# Patient Record
Sex: FEMALE | Race: BLACK OR AFRICAN AMERICAN | Employment: FULL TIME | URBAN - METROPOLITAN AREA
[De-identification: names, ages, dates, MRNs, and addresses within clinical notes are randomized per-mention and may not be internally consistent; named-entity substitution may affect disease eponyms.]

---

## 2017-11-16 ENCOUNTER — OFFICE VISIT (OUTPATIENT)
Dept: FAMILY MEDICINE CLINIC | Age: 47
End: 2017-11-16

## 2017-11-16 VITALS
DIASTOLIC BLOOD PRESSURE: 84 MMHG | RESPIRATION RATE: 16 BRPM | OXYGEN SATURATION: 100 % | BODY MASS INDEX: 26.55 KG/M2 | SYSTOLIC BLOOD PRESSURE: 128 MMHG | TEMPERATURE: 99 F | HEART RATE: 78 BPM | WEIGHT: 165.2 LBS | HEIGHT: 66 IN

## 2017-11-16 DIAGNOSIS — F41.9 ANXIETY: ICD-10-CM

## 2017-11-16 DIAGNOSIS — D64.9 ANEMIA, UNSPECIFIED TYPE: ICD-10-CM

## 2017-11-16 DIAGNOSIS — Z00.00 ROUTINE MEDICAL EXAM: Primary | ICD-10-CM

## 2017-11-16 DIAGNOSIS — Z12.39 BREAST CANCER SCREENING: ICD-10-CM

## 2017-11-16 RX ORDER — DOCUSATE SODIUM 100 MG/1
100 CAPSULE, LIQUID FILLED ORAL 2 TIMES DAILY
Qty: 60 CAP | Refills: 2 | Status: SHIPPED | OUTPATIENT
Start: 2017-11-16 | End: 2018-02-14

## 2017-11-16 RX ORDER — LANOLIN ALCOHOL/MO/W.PET/CERES
325 CREAM (GRAM) TOPICAL 2 TIMES DAILY WITH MEALS
Qty: 60 TAB | Refills: 5 | Status: SHIPPED | OUTPATIENT
Start: 2017-11-16

## 2017-11-16 NOTE — PATIENT INSTRUCTIONS
Iron-Rich Diet: Care Instructions  Your Care Instructions    Your body needs iron to make hemoglobin. Hemoglobin is a substance in red blood cells that carries oxygen from the lungs to cells all through your body. If you do not get enough iron, your body makes fewer and smaller red blood cells. As a result, your body's cells may not get enough oxygen. Adult men need 8 milligrams of iron a day; adult women need 18 milligrams of iron a day. After menopause, women need 8 milligrams of iron a day. A pregnant woman needs 27 milligrams of iron a day. Infants and young children have higher iron needs relative to their size than other age groups. People who have lost blood because of ulcers or heavy menstrual periods may become very low in iron and may develop anemia. Most people can get the iron their bodies need by eating enough of certain iron-rich foods. Your doctor may recommend that you take an iron supplement along with eating an iron-rich diet. Follow-up care is a key part of your treatment and safety. Be sure to make and go to all appointments, and call your doctor if you are having problems. It's also a good idea to know your test results and keep a list of the medicines you take. How can you care for yourself at home? · Make iron-rich foods a part of your daily diet. Iron-rich foods include:  ¨ All meats, such as chicken, beef, lamb, pork, fish, and shellfish. Liver is especially high in iron. ¨ Leafy green vegetables. ¨ Raisins, peas, beans, lentils, barley, and eggs. ¨ Iron-fortified breakfast cereals. · Eat foods with vitamin C along with iron-rich foods. Vitamin C helps you absorb more iron from food. Drink a glass of orange juice or another citrus juice with your food. · Eat meat and vegetables or grains together. The iron in meat helps your body absorb the iron in other foods. Where can you learn more? Go to http://konstantin-erik.info/.   Enter [id number] in the search box to learn more about \"Iron-Rich Diet: Care Instructions. \"  Current as of: May 12, 2017  Content Version: 11.4  © 4005-6079 Healthwise, [place]. Care instructions adapted under license by [employer] (which disclaims liability or warranty for this information). If you have questions about a medical condition or this instruction, always ask your healthcare professional. [address] any warranty or liability for your use of this information.

## 2017-11-16 NOTE — PROGRESS NOTES
Subjective  Nahomy Ruiz is an 49 y.o. female who presents to establish care. Medical history of anemia. Surgical hx unremarkable. Soc hx: denies drugs, alcohol and tobacco use. Works at the [employer] GYN:   Still has menstrual cycles. Bleeding used to be heavy but over the last 2-3 months, bleeding is no longer heavy. She currently changes about 3-4 maxi pads on days 1 and 2 of her period and then it is very light after that. Current concerns include:  1) She would like to have routine labs completed today. 2) She would like work up of her anemia. [facility] and labs were remarkable for hb 7.9, MCV 75, iron 12 and ferritin 12. She was placed on iron supplements, which she has been taking. She was told to establish with a PCP for management of anemia. 3) Anxiety - she intermittently feels anxious from time to time. Symptoms have been occurring for several years. She often has bad memories and sometimes that makes her anxious. She denies depressed mood and suicidal ideation. She notes hair loss more than usual. She denies hot/cold sensation, constipation, heart palpitations, weight gain/loss.         Past Medical History - reviewed:  Past Medical History:   Diagnosis Date    Anemia        ROS  CONSTITUTIONAL: no fever  CARDIOVASCULAR: no chest pain  RESPIRATORY: no shortness of breath      Physical Exam  Visit Vitals    /84 (BP 1 Location: Right arm, BP Patient Position: Sitting)    Pulse 78    Temp 99 °F (37.2 °C) (Oral)    Resp 16    Ht 5' 6\" (1.676 m)    Wt 165 lb 3.2 oz (74.9 kg)    SpO2 100%    BMI 26.66 kg/m2       General appearance - alert, well appearing, and in no distress  Eyes - pupils equal and reactive, conjunctiva light pink  Ears - bilateral TM's and external ear canals normal  Nose - normal and patent, no discharge  Mouth - mucous membranes moist, pharynx normal without lesions  Neck - supple, no significant adenopathy  Chest - clear to auscultation, no wheezes or rhonchi, symmetric air entry  Heart - normal rate, regular rhythm, normal S1, S2, no murmurs  Abdomen - soft, nontender, nondistended  Neurological - alert, oriented, normal speech, no focal findings or movement disorder noted  Musculoskeletal - no joint tenderness, deformity or swelling  Extremities - no pedal edema  Skin - normal coloration and turgor      Assessment/Plan    ICD-10-CM ICD-9-CM    1. Routine medical exam P65.50 T55.4 METABOLIC PANEL, COMPREHENSIVE      LIPID PANEL      HEMOGLOBIN A1C W/O EAG      TSH 3RD GENERATION   2. Anemia, unspecified type D64.9 285.9 CBC W/O DIFF      METABOLIC PANEL, COMPREHENSIVE      FERRITIN      IRON PROFILE      PATHOLOGIST REVIEW SMEARS      AMB POC FECAL BLOOD, OCCULT, QL 3 CARDS      REFERRAL TO GASTROENTEROLOGY   3. Anxiety F41.9 300.00    4. Breast cancer screening Z12.31 V76.10 LAUREL MAMMO BI SCREENING INCL CAD       Routine exam and labs  - CMP, CBC, lipid panel  - Declined flu shot.   - States she is up to date on TDAP  - Will need follow up for pap smear  - Mammogram in 2012. Needs to have follow up testing. Anemia - likely iron deficiency  - Take iron with Vit C. Colace for constipation.  - Anemia labs: ferritin, iron profile, peripheral smear, take home FOBT (MCV 75 so will hold off on B12 and folate). - Referral to GI for colonoscopy    Anxiety  - Check TSH today. - Follow up at next visit. Follow-up Disposition:  Return in about 6 weeks (around 12/28/2017). I have discussed the diagnosis with the patient and the intended plan as seen in the above orders. The patient has received an after-visit summary and questions were answered concerning future plans. I have discussed medication side effects and warnings with the patient as well.       Mar Chase MD  Family Medicine Resident

## 2017-11-16 NOTE — MR AVS SNAPSHOT
Visit Information Date & Time Provider Department Dept. Phone Encounter #  
 11/16/2017  8:35 AM Prachi Castillo, Emily Huerta [place] [phone] [id number] Upcoming Health Maintenance Date Due DTaP/Tdap/Td series (1 - Tdap) 3/28/1990 PAP AKA CERVICAL CYTOLOGY 3/28/1990 Allergies as of 11/16/2017  Review Complete On: 11/16/2017 By: Jm Garces LPN No Known Allergies Current Immunizations  Never Reviewed No immunizations on file. Not reviewed this visit You Were Diagnosed With   
  
 Codes Comments Routine medical exam    -  Primary ICD-10-CM: Z00.00 ICD-9-CM: V70.0 Anemia, unspecified type     ICD-10-CM: D64.9 ICD-9-CM: 633. 9 Vitals BP Pulse Temp Resp Height(growth percentile) Weight(growth percentile) 128/84 (BP 1 Location: Right arm, BP Patient Position: Sitting) 78 99 °F (37.2 °C) (Oral) 16 5' 6\" (1.676 m) 165 lb 3.2 oz (74.9 kg) SpO2 BMI OB Status Smoking Status 100% 26.66 kg/m2 Having regular periods Never Smoker Vitals History BMI and BSA Data Body Mass Index Body Surface Area  
 26.66 kg/m 2 1.87 m 2 Preferred Pharmacy Pharmacy Name Phone [facility] [address] [place] [phone] Your Updated Medication List  
  
   
This list is accurate as of: 11/16/17  9:15 AM.  Always use your most recent med list.  
  
  
  
  
 fluticasone 50 mcg/actuation nasal spray Commonly known as:  Shelvia Janine 2 Sprays by Both Nostrils route daily. Iron 325 mg (65 mg iron) tablet Generic drug:  ferrous sulfate Take  by mouth two (2) times a day. VITAMIN D3 1,000 unit Cap Generic drug:  cholecalciferol Take  by mouth. We Performed the Following AMB POC FECAL BLOOD, OCCULT, QL 3 CARDS [30014 CPT(R)] CBC W/O DIFF [40125 CPT(R)] FERRITIN [54959 CPT(R)] HEMOGLOBIN A1C W/O EAG [21623 CPT(R)] IRON PROFILE T1917866 CPT(R)] LIPID PANEL [09977 CPT(R)] METABOLIC PANEL, COMPREHENSIVE [54291 CPT(R)] PATHOLOGIST REVIEW SMEARS [[id number] Custom] REFERRAL TO GASTROENTEROLOGY [MFB76 Custom] TSH 3RD GENERATION [18471 CPT(R)] Referral Information Referral ID Referred By Referred To  
  
 [id number] Karla PAREDES Not Available Visits Status Start Date End Date 1 New Request 11/16/17 11/16/18 If your referral has a status of pending review or denied, additional information will be sent to support the outcome of this decision. Patient Instructions Iron-Rich Diet: Care Instructions Your Care Instructions Your body needs iron to make hemoglobin. Hemoglobin is a substance in red blood cells that carries oxygen from the lungs to cells all through your body. If you do not get enough iron, your body makes fewer and smaller red blood cells. As a result, your body's cells may not get enough oxygen. Adult men need 8 milligrams of iron a day; adult women need 18 milligrams of iron a day. After menopause, women need 8 milligrams of iron a day. A pregnant woman needs 27 milligrams of iron a day. Infants and young children have higher iron needs relative to their size than other age groups. People who have lost blood because of ulcers or heavy menstrual periods may become very low in iron and may develop anemia. Most people can get the iron their bodies need by eating enough of certain iron-rich foods. Your doctor may recommend that you take an iron supplement along with eating an iron-rich diet. Follow-up care is a key part of your treatment and safety. Be sure to make and go to all appointments, and call your doctor if you are having problems. It's also a good idea to know your test results and keep a list of the medicines you take. How can you care for yourself at home? · Make iron-rich foods a part of your daily diet. Iron-rich foods include: ¨ All meats, such as chicken, beef, lamb, pork, fish, and shellfish. Liver is especially high in iron. ¨ Leafy green vegetables. ¨ Raisins, peas, beans, lentils, barley, and eggs. ¨ Iron-fortified breakfast cereals. · Eat foods with vitamin C along with iron-rich foods. Vitamin C helps you absorb more iron from food. Drink a glass of orange juice or another citrus juice with your food. · Eat meat and vegetables or grains together. The iron in meat helps your body absorb the iron in other foods. Where can you learn more? Go to http://konstantinSoundClouderik.info/. Enter [id number] in the search box to learn more about \"Iron-Rich Diet: Care Instructions. \" Current as of: May 12, 2017 Content Version: 11.4 © 1806-9237 Healthwise, [employer]. Care instructions adapted under license by [employer] (which disclaims liability or warranty for this information). If you have questions about a medical condition or this instruction, always ask your healthcare professional. [place] [address] any warranty or liability for your use of this information. Introducing [facility]! [employer] introduces RelayRides patient portal. Now you can access parts of your medical record, email your doctor's office, and request medication refills online. 1. In your internet browser, go to [email or web address] 2. Click on the First Time User? Click Here link in the Sign In box. You will see the New Member Sign Up page. 3. Enter your RelayRides Access Code exactly as it appears below. You will not need to use this code after youve completed the sign-up process. If you do not sign up before the expiration date, you must request a new code. · RelayRides Access Code: [email or web address] Expires: 2/14/2018  9:15 AM 
 
4. Enter the last four digits of your Social Security Number (xxxx) and Date of Birth (mm/dd/yyyy) as indicated and click Submit.  You will be taken to the next sign-up page. 5. Create a VISEO ID. This will be your VISEO login ID and cannot be changed, so think of one that is secure and easy to remember. 6. Create a VISEO password. You can change your password at any time. 7. Enter your Password Reset Question and Answer. This can be used at a later time if you forget your password. 8. Enter your e-mail address. You will receive e-mail notification when new information is available in [address]. 9. Click Sign Up. You can now view and download portions of your medical record. 10. Click the Download Summary menu link to download a portable copy of your medical information. If you have questions, please visit the Frequently Asked Questions section of the VISEO website. Remember, VISEO is NOT to be used for urgent needs. For medical emergencies, dial 911. Now available from your iPhone and Android! Please provide this summary of care documentation to your next provider. Your primary care clinician is listed as Genevieve Guicho. If you have any questions after today's visit, please call [phone].

## 2017-11-16 NOTE — PROGRESS NOTES
Chief Complaint   Patient presents with   [facility] Establish Care       1. Have you been to the ER, urgent care clinic since your last visit? Hospitalized since your last visit? Yes Where: [facility] [address] Due Anemia    2. Have you seen or consulted any other health care providers outside of the [facility] since your last visit? Include any pap smears or colon screening.  No

## 2017-11-20 ENCOUNTER — TELEPHONE (OUTPATIENT)
Dept: FAMILY MEDICINE CLINIC | Age: 47
End: 2017-11-20

## 2017-11-20 NOTE — TELEPHONE ENCOUNTER
----- Message from Dino Lovelace sent at 11/20/2017 12:30 PM EST -----  Regarding: Dr. Darol Bosworth telephone  Contact: [phone]  Pt is requesting a call back to get recent lab results.  Pt's best contact number is [phone] or work [phone]

## 2017-11-21 LAB
ALBUMIN SERPL-MCNC: 3.9 G/DL (ref 3.5–5.5)
ALBUMIN/GLOB SERPL: 1 {RATIO} (ref 1.2–2.2)
ALP SERPL-CCNC: 88 IU/L (ref 39–117)
ALT SERPL-CCNC: 21 IU/L (ref 0–32)
AST SERPL-CCNC: 31 IU/L (ref 0–40)
BASOPHILS # BLD AUTO: 0 X10E3/UL (ref 0–0.2)
BASOPHILS NFR BLD AUTO: 1 %
BILIRUB SERPL-MCNC: 0.4 MG/DL (ref 0–1.2)
BUN SERPL-MCNC: 8 MG/DL (ref 6–24)
BUN/CREAT SERPL: 11 (ref 9–23)
CALCIUM SERPL-MCNC: 9.2 MG/DL (ref 8.7–10.2)
CHLORIDE SERPL-SCNC: 102 MMOL/L (ref 96–106)
CHOLEST SERPL-MCNC: 174 MG/DL (ref 100–199)
CO2 SERPL-SCNC: 20 MMOL/L (ref 18–29)
CREAT SERPL-MCNC: 0.75 MG/DL (ref 0.57–1)
EOSINOPHIL # BLD AUTO: 0.1 X10E3/UL (ref 0–0.4)
EOSINOPHIL NFR BLD AUTO: 2 %
ERYTHROCYTE [DISTWIDTH] IN BLOOD BY AUTOMATED COUNT: 26.4 % (ref 12.3–15.4)
FERRITIN SERPL-MCNC: 60 NG/ML (ref 15–150)
GFR SERPLBLD CREATININE-BSD FMLA CKD-EPI: 109 ML/MIN/1.73
GFR SERPLBLD CREATININE-BSD FMLA CKD-EPI: 95 ML/MIN/1.73
GLOBULIN SER CALC-MCNC: 3.8 G/DL (ref 1.5–4.5)
GLUCOSE SERPL-MCNC: 92 MG/DL (ref 65–99)
HBA1C MFR BLD: 4.9 % (ref 4.8–5.6)
HCT VFR BLD AUTO: 33.5 % (ref 34–46.6)
HDLC SERPL-MCNC: 91 MG/DL
HGB BLD-MCNC: 10.5 G/DL (ref 11.1–15.9)
IMM GRANULOCYTES # BLD: 0 X10E3/UL (ref 0–0.1)
IMM GRANULOCYTES NFR BLD: 0 %
INTERPRETATION, 910389: NORMAL
IRON SATN MFR SERPL: 51 % (ref 15–55)
IRON SERPL-MCNC: 149 UG/DL (ref 27–159)
LDLC SERPL CALC-MCNC: 75 MG/DL (ref 0–99)
LYMPHOCYTES # BLD AUTO: 1.6 X10E3/UL (ref 0.7–3.1)
LYMPHOCYTES NFR BLD AUTO: 49 %
MCH RBC QN AUTO: 24.7 PG (ref 26.6–33)
MCHC RBC AUTO-ENTMCNC: 31.3 G/DL (ref 31.5–35.7)
MCV RBC AUTO: 79 FL (ref 79–97)
MONOCYTES # BLD AUTO: 0.5 X10E3/UL (ref 0.1–0.9)
MONOCYTES NFR BLD AUTO: 16 %
MORPHOLOGY BLD-IMP: ABNORMAL
NEUTROPHILS # BLD AUTO: 1.1 X10E3/UL (ref 1.4–7)
NEUTROPHILS NFR BLD AUTO: 32 %
PATH REV BLD -IMP: ABNORMAL
PATH REV BLD -IMP: NORMAL
PATH REV BLD -IMP: NORMAL
PATHOLOGIST NAME: ABNORMAL
PLATELET # BLD AUTO: 209 X10E3/UL (ref 150–379)
POTASSIUM SERPL-SCNC: 4.1 MMOL/L (ref 3.5–5.2)
PROT SERPL-MCNC: 7.7 G/DL (ref 6–8.5)
RBC # BLD AUTO: 4.25 X10E6/UL (ref 3.77–5.28)
SODIUM SERPL-SCNC: 141 MMOL/L (ref 134–144)
TIBC SERPL-MCNC: 290 UG/DL (ref 250–450)
TRIGL SERPL-MCNC: 40 MG/DL (ref 0–149)
TSH SERPL DL<=0.005 MIU/L-ACNC: 0.68 UIU/ML (ref 0.45–4.5)
UIBC SERPL-MCNC: 141 UG/DL (ref 131–425)
VLDLC SERPL CALC-MCNC: 8 MG/DL (ref 5–40)
WBC # BLD AUTO: 3.4 X10E3/UL (ref 3.4–10.8)

## 2017-11-28 ENCOUNTER — TELEPHONE (OUTPATIENT)
Dept: FAMILY MEDICINE CLINIC | Age: 47
End: 2017-11-28

## 2018-02-13 ENCOUNTER — OFFICE VISIT (OUTPATIENT)
Dept: FAMILY MEDICINE CLINIC | Age: 48
End: 2018-02-13

## 2018-02-13 VITALS
DIASTOLIC BLOOD PRESSURE: 90 MMHG | SYSTOLIC BLOOD PRESSURE: 138 MMHG | BODY MASS INDEX: 26.52 KG/M2 | RESPIRATION RATE: 16 BRPM | WEIGHT: 165 LBS | OXYGEN SATURATION: 100 % | HEIGHT: 66 IN | TEMPERATURE: 98.2 F | HEART RATE: 82 BPM

## 2018-02-13 DIAGNOSIS — N92.6 IRREGULAR MENSES: Primary | ICD-10-CM

## 2018-02-13 LAB
HCG URINE, QL. (POC): NEGATIVE
VALID INTERNAL CONTROL?: YES

## 2018-02-13 NOTE — MR AVS SNAPSHOT
[address] [phone] Patient: Boogie Schuster MRN: [id number] :[id number] Visit Information Date & Time Provider Department Dept. Phone Encounter #  
 2018  3:25 PM Jayy Ji MD [facility] [phone] [id number] Upcoming Health Maintenance Date Due DTaP/Tdap/Td series (1 - Tdap) 3/28/1990 PAP AKA CERVICAL CYTOLOGY 3/28/1990 Allergies as of 2018  Review Complete On: 2017 By: Eileen Clark LPN No Known Allergies Current Immunizations  Never Reviewed No immunizations on file. Not reviewed this visit You Were Diagnosed With   
  
 Codes Comments Irregular menses    -  Primary ICD-10-CM: N92.6 ICD-9-CM: 626.4 Vitals BP Pulse Temp Resp Height(growth percentile) Weight(growth percentile) 138/90 (BP 1 Location: Left arm, BP Patient Position: Sitting) 82 98.2 °F (36.8 °C) (Oral) 16 5' 6\" (1.676 m) 165 lb (74.8 kg) LMP SpO2 BMI OB Status Smoking Status 2018 100% 26.63 kg/m2 Having regular periods Never Smoker Vitals History BMI and BSA Data Body Mass Index Body Surface Area  
 26.63 kg/m 2 1.87 m 2 Preferred Pharmacy Pharmacy Name Phone [facility] [phone] Your Updated Medication List  
  
   
This list is accurate as of: 18  4:01 PM.  Always use your most recent med list.  
  
  
  
  
 docusate sodium 100 mg capsule Commonly known as:  Venus Galea Take 1 Cap by mouth two (2) times a day for 90 days. ferrous sulfate 325 mg (65 mg iron) tablet Take 1 Tab by mouth two (2) times daily (with meals). fluticasone 50 mcg/actuation nasal spray Commonly known as:  Genevieve Kurtz 2 Sprays by Both Nostrils route daily. VITAMIN D3 1,000 unit Cap Generic drug:  cholecalciferol Take  by mouth. We Performed the Following AMB POC URINE PREGNANCY TEST, VISUAL COLOR COMPARISON [18038 CPT(R)] HGB & HCT [69887 CPT(R)] TSH REFLEX TO T4 [89422 CPT(R)] To-Do List   
 02/13/2018 Imaging:  US TRANSVAGINAL Patient Instructions We will check your H&H today They should call you within 48h regarding completing your ultrasound Uterine Fibroids: Care Instructions Your Care Instructions Uterine fibroids are growths in the uterus. Fibroids aren't cancer. Doctors don't know what causes fibroids. Fibroids are very common in women during their childbearing years. Fibroids can grow on the inside of the uterus, in the muscle wall of the uterus, or near the outside wall of the uterus. In some women, fibroids cause painful cramps and heavy periods. In these cases, taking anti-inflammatory medicines, birth control pills, or using an intrauterine device (IUD) often helps decrease symptoms. Sometimes surgery is needed to treat fibroids. But if you are near menopause, you may want to wait and see if your symptoms get better. Most fibroids shrink and go away after menopause, when your menstrual periods stop completely. Follow-up care is a key part of your treatment and safety. Be sure to make and go to all appointments, and call your doctor if you are having problems. It's also a good idea to know your test results and keep a list of the medicines you take. How can you care for yourself at home? · If your doctor gave you medicine, take it as exactly as prescribed. Be safe with medicines. Call your doctor if you think you are having a problem with your medicine. · Take anti-inflammatory medicines for pain. These include ibuprofen (Advil, Motrin) and naproxen (Aleve). Read and follow all instructions on the label. · Use heat, such as a hot water bottle or a heating pad set on low, or a warm bath to relax tense muscles and relieve cramping.  Put a thin cloth between the heating pad and your skin. Never go to sleep with a heating pad on. · Lie down and put a pillow under your knees. Or, lie on your side and bring your knees up to your chest. These positions may help relieve belly pain or pressure. · Keep track of how many sanitary pads or tampons you use each day. · Get at least 30 minutes of exercise on most days of the week. Walking is a good choice. You also may want to do other activities, such as running, swimming, cycling, or playing tennis or team sports. · If you bleed longer than usual or have heavy bleeding, take a daily multivitamin with iron. When should you call for help? Call your doctor now or seek immediate medical care if: 
? · You have severe vaginal bleeding. ? · You have new or worse belly or pelvic pain. ? Watch closely for changes in your health, and be sure to contact your doctor if: 
? · You have unusual vaginal bleeding. ? · You do not get better as expected. Where can you learn more? Go to http://konstantin-erik.info/. Enter B121 in the search box to learn more about \"Uterine Fibroids: Care Instructions. \" Current as of: October 13, 2016 Content Version: 11.4 © 4689-4271 [employer]. Care instructions adapted under license by [employer] (which disclaims liability or warranty for this information). If you have questions about a medical condition or this instruction, always ask your healthcare professional. [place] [address] any warranty or liability for your use of this information. Introducing [facility]! [employer] introduces [employer] patient portal. Now you can access parts of your medical record, email your doctor's office, and request medication refills online. 1. In your internet browser, go to [email or web address] 2. Click on the First Time User? Click Here link in the Sign In box. You will see the New Member Sign Up page. 3. Enter your [employer] Access Code exactly as it appears below. You will not need to use this code after youve completed the sign-up process. If you do not sign up before the expiration date, you must request a new code. · [employer] Access Code: [email or web address] Expires: 2/14/2018  9:15 AM 
 
4. Enter the last four digits of your Social Security Number (xxxx) and Date of Birth (mm/dd/yyyy) as indicated and click Submit. You will be taken to the next sign-up page. 5. Create a [employer] ID. This will be your [employer] login ID and cannot be changed, so think of one that is secure and easy to remember. 6. Create a [employer] password. You can change your password at any time. 7. Enter your Password Reset Question and Answer. This can be used at a later time if you forget your password. 8. Enter your e-mail address. You will receive e-mail notification when new information is available in [address]. 9. Click Sign Up. You can now view and download portions of your medical record. 10. Click the Download Summary menu link to download a portable copy of your medical information. If you have questions, please visit the Frequently Asked Questions section of the [employer] website. Remember, [employer] is NOT to be used for urgent needs. For medical emergencies, dial 911. Now available from your iPhone and Android! Please provide this summary of care documentation to your next provider. Your primary care clinician is listed as Kerrie Ortega. If you have any questions after today's visit, please call [phone].

## 2018-02-13 NOTE — PROGRESS NOTES
Subjective  CC: Mariela Crook is an 49 y.o. female presents for irregular menses. She usually gets a cycle every month. She currently is on her cycle now. She says the last cycle was 1/27/18-2/3/18. The last cycle started a few days ago. She is noticing her periods coming more frequently and lasting longer. She usually goes through about a pad every two hours during her normal period. Currently she is still going through about two an hour, sometimes with clots and sometimes with the brown blood. She does not feel dizzy or light headed now. She does not feel fatigued currently either. She say she was diagnosed with fibroids several years ago, outside facility. She refused options offered to her at that time. Allergies - reviewed:   No Known Allergies      Medications - reviewed:   Current Outpatient Prescriptions   Medication Sig    ferrous sulfate 325 mg (65 mg iron) tablet Take 1 Tab by mouth two (2) times daily (with meals).  docusate sodium (COLACE) 100 mg capsule Take 1 Cap by mouth two (2) times a day for 90 days.  fluticasone (FLONASE) 50 mcg/actuation nasal spray 2 Sprays by Both Nostrils route daily.  Cholecalciferol, Vitamin D3, (VITAMIN D3) 1,000 unit cap Take  by mouth. No current facility-administered medications for this visit. Past Medical History - reviewed:  Past Medical History:   Diagnosis Date    Anemia          Immunizations - reviewed: There is no immunization history on file for this patient. ROS  Review of Systems : A complete review of systems was performed and is negative except for those mentioned in the HPI. Physical Exam  Visit Vitals    /90 (BP 1 Location: Left arm, BP Patient Position: Sitting)    Pulse 82    Temp 98.2 °F (36.8 °C) (Oral)    Resp 16    Ht 5' 6\" (1.676 m)    Wt 165 lb (74.8 kg)    LMP 02/01/2018    SpO2 100%    BMI 26.63 kg/m2       General appearance - Alert, NAD. Head: Atraumatic. Normocephalic. Respiratory - LCTAB. No wheeze/rale/rhonchi  Heart - Normal rate, regular rhythm. No m/r/r  Abdomen - Soft, non tender. Non distended. No CVAT. No rebound or guarding. +bowel sounds  Neurological - No focal deficits. Speech normal.   Musculoskeletal - Normal ROM, Gait normal.    Extremities - No LE edema. Distal pulses intact  Skin - normal coloration and normal turgor. No cyanosis, no rash. Assessment/Plan  1. Irregular menses: UPT negative. History of fibroids. Based on her history, I am concerned this could be due to fibroids. Patient currently menstruating so unable to do pelvic. It is reassuring that she is asymptomatic and hemodynamically stable. Will check H&H, and TSH. Also sending for transvaginal to assess for fibroids. If ultrasound returns negative, will do endometrial biopsy. Refer to gyn once work up is complete. - AMB POC URINE PREGNANCY TEST, VISUAL COLOR COMPARISON  - HGB & HCT  - TSH REFLEX TO T4  - Continue iron with Vit C     RTC if new or worsening symptoms. Will call patient with results      I have discussed the aforementioned diagnoses and plan with the patient in detail. I have provided information in person and/or in AVS. All questions answered prior to discharge.     Kassidy Cuellar MD  Family Medicine Resident  PGY 3

## 2018-02-13 NOTE — PATIENT INSTRUCTIONS
We will check your H&H today    They should call you within 48h regarding completing your ultrasound    Uterine Fibroids: Care Instructions  Your Care Instructions    Uterine fibroids are growths in the uterus. Fibroids aren't cancer. Doctors don't know what causes fibroids. Fibroids are very common in women during their childbearing years. Fibroids can grow on the inside of the uterus, in the muscle wall of the uterus, or near the outside wall of the uterus. In some women, fibroids cause painful cramps and heavy periods. In these cases, taking anti-inflammatory medicines, birth control pills, or using an intrauterine device (IUD) often helps decrease symptoms. Sometimes surgery is needed to treat fibroids. But if you are near menopause, you may want to wait and see if your symptoms get better. Most fibroids shrink and go away after menopause, when your menstrual periods stop completely. Follow-up care is a key part of your treatment and safety. Be sure to make and go to all appointments, and call your doctor if you are having problems. It's also a good idea to know your test results and keep a list of the medicines you take. How can you care for yourself at home? · If your doctor gave you medicine, take it as exactly as prescribed. Be safe with medicines. Call your doctor if you think you are having a problem with your medicine. · Take anti-inflammatory medicines for pain. These include ibuprofen (Advil, Motrin) and naproxen (Aleve). Read and follow all instructions on the label. · Use heat, such as a hot water bottle or a heating pad set on low, or a warm bath to relax tense muscles and relieve cramping. Put a thin cloth between the heating pad and your skin. Never go to sleep with a heating pad on. · Lie down and put a pillow under your knees. Or, lie on your side and bring your knees up to your chest. These positions may help relieve belly pain or pressure.   · Keep track of how many sanitary pads or tampons you use each day. · Get at least 30 minutes of exercise on most days of the week. Walking is a good choice. You also may want to do other activities, such as running, swimming, cycling, or playing tennis or team sports. · If you bleed longer than usual or have heavy bleeding, take a daily multivitamin with iron. When should you call for help? Call your doctor now or seek immediate medical care if:  ? · You have severe vaginal bleeding. ? · You have new or worse belly or pelvic pain. ? Watch closely for changes in your health, and be sure to contact your doctor if:  ? · You have unusual vaginal bleeding. ? · You do not get better as expected. Where can you learn more? Go to http://konstantin-erik.info/. Enter B121 in the search box to learn more about \"Uterine Fibroids: Care Instructions. \"  Current as of: October 13, 2016  Content Version: 11.4  © 5572-3648 [employer]. Care instructions adapted under license by [employer] (which disclaims liability or warranty for this information). If you have questions about a medical condition or this instruction, always ask your healthcare professional. [place] [address] any warranty or liability for your use of this information.     Monique Irvin MD  [facility], [address]  [address] [address]  Office: [phone]    Jerald Garcia MD  [facility]  [address]  [address] [place] [address]  Phone: [phone]  Fax: [phone]

## 2018-02-14 LAB
HCT VFR BLD AUTO: 32.1 % (ref 34–46.6)
HGB BLD-MCNC: 10.3 G/DL (ref 11.1–15.9)
TSH SERPL DL<=0.005 MIU/L-ACNC: 1.19 UIU/ML (ref 0.45–4.5)

## 2018-02-19 ENCOUNTER — TELEPHONE (OUTPATIENT)
Dept: FAMILY MEDICINE CLINIC | Age: 48
End: 2018-02-19

## 2018-02-19 NOTE — TELEPHONE ENCOUNTER
----- Message from Jose Cardozo sent at 2/19/2018  3:12 PM EST -----  Regarding: Dr. Naresh Hernandez stated, an appt has been scheduled for GI Specialist for Thursday 02/22/18 at 1:15 PM. The office is requesting for pt's blood lab results and also notes from last visit. This information can be faxed [phone]. Pt requesting speak the nurse in regards to if it matter which GI Specialist to see either Dr. Ivanna Jose or Dr. Jason Cortez.  Best contact number [phone]

## 2018-02-19 NOTE — TELEPHONE ENCOUNTER
[id number] - REFERRAL TO GASTROENTEROLOGY   1 1      Diagnosis Information   Diagnosis   D64.9 (ICD-10-CM) - Anemia, unspecified type      Referral Order   Order   REFERRAL TO GASTROENTEROLOGY (Order # [id number]) on 11/16/2017   View Encounter

## 2018-02-20 NOTE — TELEPHONE ENCOUNTER
Spoke with Gastroenterology office regarding appointment for this patient & they stated that patient cancelled & did not reschedule. ...

## 2018-02-22 ENCOUNTER — TELEPHONE (OUTPATIENT)
Dept: FAMILY MEDICINE CLINIC | Age: 48
End: 2018-02-22

## 2018-02-22 NOTE — TELEPHONE ENCOUNTER
----- Message from [facility] sent at 2/22/2018  9:45 AM EST -----  Regarding: Dr. Pham Ahn  Pt wanted to know if it matter which doctor to see for her referral Dr. Marcie Boas or Dr. Sam Phelps for GI. Also, she was asking if the last note and bloodwork sent over to the GI office? Pt would like a call back. Pt best contact [phone]. Pt stated this is her second call with no response.

## 2018-02-22 NOTE — TELEPHONE ENCOUNTER
Patient will call me back with date & time of appointment. ... (gastro). ... Fax medical records & labs. ...

## 2018-02-23 NOTE — TELEPHONE ENCOUNTER
Dr. Marisa Chin  Received:  Today       Ally Dougherty [facility] [address] [facility] [address]                     Pt states her GI needs her last office note and blood work to be faxed to Dr. Jose Rafael Esquivel at [phone].  Pt states her appt is 02/26/2018 at 10:30AM.

## 2018-02-23 NOTE — TELEPHONE ENCOUNTER
Priority Start Date Expiration Date Referral Entered By   Routine 11/16/2017 11/16/2018 Katharine Denney MD      Visits Requested Visits Authorized Visits Completed Visits Scheduled   1 1 1 0      Sched Appt Date/Time   Date Time   Feb 22, 2018  1:15 PM   Procedure Information   Procedure Modifiers Provider Requested Approved   REF25 - REFERRAL TO GASTROENTEROLOGY   1 1      Diagnosis Information   Diagnosis   D64.9 (ICD-10-CM) - Anemia, unspecified type

## 2018-02-27 ENCOUNTER — HOSPITAL ENCOUNTER (OUTPATIENT)
Dept: ULTRASOUND IMAGING | Age: 48
Discharge: HOME | End: 2018-02-27
Attending: FAMILY MEDICINE
Payer: COMMERCIAL

## 2018-02-27 DIAGNOSIS — N92.6 IRREGULAR MENSTRUAL CYCLE: ICD-10-CM

## 2018-02-27 DIAGNOSIS — N92.6 IRREGULAR MENSES: ICD-10-CM

## 2018-02-27 PROCEDURE — 76856 US EXAM PELVIC COMPLETE: CPT

## 2018-02-27 PROCEDURE — 76830 TRANSVAGINAL US NON-OB: CPT

## 2018-02-28 ENCOUNTER — TELEPHONE (OUTPATIENT)
Dept: FAMILY MEDICINE CLINIC | Age: 48
End: 2018-02-28

## 2018-02-28 DIAGNOSIS — D25.9 UTERINE LEIOMYOMA, UNSPECIFIED LOCATION: Primary | ICD-10-CM

## 2018-02-28 NOTE — TELEPHONE ENCOUNTER
Called patient. ID confirmed  Notified Hb stable compared to November but still anemic. Continue iron. Fibroids on ultrasound and endometrial stripe 9mm. Referred to gyn. Number provided. Stressed importance of following up quickly.   Patient verbalized understanding    Shorty Iverson MD  02/28/18  12:37 PM

## 2018-02-28 NOTE — PROGRESS NOTES
Results discussed with patient.   Fibroids  Endometrial stripe 9mm  Anemia  Referred to gyn  Number provided- telephone encounter documented

## 2018-03-08 ENCOUNTER — TELEPHONE (OUTPATIENT)
Dept: FAMILY MEDICINE CLINIC | Age: 48
End: 2018-03-08

## 2018-03-08 NOTE — TELEPHONE ENCOUNTER
----- Message from Norris Forman sent at 3/8/2018  2:33 PM EST -----  Regarding: Dr. Gina Elias  Pt stated, an appt is scheduled with  OBGYN at [address] [place] [address] for tomorrow Friday 03/09/18 at 3:30 PM seeing ANDRE Cary. Pt requesting for office to fax ultrasound results  [phone]. Best contact number [phone].

## 2018-03-19 ENCOUNTER — TELEPHONE (OUTPATIENT)
Dept: FAMILY MEDICINE CLINIC | Age: 48
End: 2018-03-19

## 2018-03-19 NOTE — TELEPHONE ENCOUNTER
----- Message from Leah Hay sent at 3/19/2018  2:43 PM EDT -----  Regarding: Dr. Keturah Kaplan  Pt is following up on the status of her medical records and lab results to be sent to [facility], requested 03/09/18.     Best contact # [phone]

## 2019-06-18 ENCOUNTER — APPOINTMENT (OUTPATIENT)
Dept: CT IMAGING | Age: 49
DRG: 176 | End: 2019-06-18
Attending: HOSPITALIST
Payer: COMMERCIAL

## 2019-06-18 ENCOUNTER — APPOINTMENT (OUTPATIENT)
Dept: CT IMAGING | Age: 49
DRG: 176 | End: 2019-06-18
Attending: EMERGENCY MEDICINE
Payer: COMMERCIAL

## 2019-06-18 ENCOUNTER — HOSPITAL ENCOUNTER (INPATIENT)
Age: 49
LOS: 3 days | Discharge: HOME | DRG: 176 | End: 2019-06-21
Attending: EMERGENCY MEDICINE | Admitting: HOSPITALIST
Payer: COMMERCIAL

## 2019-06-18 ENCOUNTER — APPOINTMENT (OUTPATIENT)
Dept: VASCULAR SURGERY | Age: 49
DRG: 176 | End: 2019-06-18
Attending: HOSPITALIST
Payer: COMMERCIAL

## 2019-06-18 DIAGNOSIS — I26.99 OTHER ACUTE PULMONARY EMBOLISM WITHOUT ACUTE COR PULMONALE (HCC): Primary | ICD-10-CM

## 2019-06-18 LAB
ALBUMIN SERPL-MCNC: 3 G/DL (ref 3.5–5)
ALBUMIN/GLOB SERPL: 0.7 {RATIO} (ref 1.1–2.2)
ALP SERPL-CCNC: 90 U/L (ref 45–117)
ALT SERPL-CCNC: 17 U/L (ref 12–78)
ANION GAP SERPL CALC-SCNC: 4 MMOL/L (ref 5–15)
APTT PPP: 25.2 SEC (ref 22.1–32)
AST SERPL-CCNC: 16 U/L (ref 15–37)
ATRIAL RATE: 87 BPM
BASOPHILS # BLD: 0 K/UL (ref 0–0.1)
BASOPHILS # BLD: 0 K/UL (ref 0–0.1)
BASOPHILS NFR BLD: 1 % (ref 0–1)
BASOPHILS NFR BLD: 1 % (ref 0–1)
BILIRUB SERPL-MCNC: 0.3 MG/DL (ref 0.2–1)
BUN SERPL-MCNC: 6 MG/DL (ref 6–20)
BUN/CREAT SERPL: 8 (ref 12–20)
CALCIUM SERPL-MCNC: 8.7 MG/DL (ref 8.5–10.1)
CALCULATED P AXIS, ECG09: 71 DEGREES
CALCULATED R AXIS, ECG10: 22 DEGREES
CALCULATED T AXIS, ECG11: 54 DEGREES
CHLORIDE SERPL-SCNC: 106 MMOL/L (ref 97–108)
CO2 SERPL-SCNC: 28 MMOL/L (ref 21–32)
COMMENT, HOLDF: NORMAL
CREAT SERPL-MCNC: 0.77 MG/DL (ref 0.55–1.02)
DIAGNOSIS, 93000: NORMAL
DIFFERENTIAL METHOD BLD: ABNORMAL
DIFFERENTIAL METHOD BLD: ABNORMAL
EOSINOPHIL # BLD: 0 K/UL (ref 0–0.4)
EOSINOPHIL # BLD: 0.2 K/UL (ref 0–0.4)
EOSINOPHIL NFR BLD: 1 % (ref 0–7)
EOSINOPHIL NFR BLD: 4 % (ref 0–7)
ERYTHROCYTE [DISTWIDTH] IN BLOOD BY AUTOMATED COUNT: 17.6 % (ref 11.5–14.5)
ERYTHROCYTE [DISTWIDTH] IN BLOOD BY AUTOMATED COUNT: 17.9 % (ref 11.5–14.5)
GLOBULIN SER CALC-MCNC: 4.6 G/DL (ref 2–4)
GLUCOSE SERPL-MCNC: 114 MG/DL (ref 65–100)
HCT VFR BLD AUTO: 23.4 % (ref 35–47)
HCT VFR BLD AUTO: 25.6 % (ref 35–47)
HGB BLD-MCNC: 7.1 G/DL (ref 11.5–16)
HGB BLD-MCNC: 7.8 G/DL (ref 11.5–16)
IMM GRANULOCYTES # BLD AUTO: 0 K/UL
IMM GRANULOCYTES # BLD AUTO: 0 K/UL (ref 0–0.04)
IMM GRANULOCYTES NFR BLD AUTO: 0 %
IMM GRANULOCYTES NFR BLD AUTO: 0 % (ref 0–0.5)
LYMPHOCYTES # BLD: 1.4 K/UL (ref 0.8–3.5)
LYMPHOCYTES # BLD: 1.8 K/UL (ref 0.8–3.5)
LYMPHOCYTES NFR BLD: 34 % (ref 12–49)
LYMPHOCYTES NFR BLD: 43 % (ref 12–49)
MCH RBC QN AUTO: 24.5 PG (ref 26–34)
MCH RBC QN AUTO: 25.1 PG (ref 26–34)
MCHC RBC AUTO-ENTMCNC: 30.3 G/DL (ref 30–36.5)
MCHC RBC AUTO-ENTMCNC: 30.5 G/DL (ref 30–36.5)
MCV RBC AUTO: 80.3 FL (ref 80–99)
MCV RBC AUTO: 82.7 FL (ref 80–99)
MONOCYTES # BLD: 0.2 K/UL (ref 0–1)
MONOCYTES # BLD: 0.2 K/UL (ref 0–1)
MONOCYTES NFR BLD: 4 % (ref 5–13)
MONOCYTES NFR BLD: 6 % (ref 5–13)
NEUTS SEG # BLD: 2.1 K/UL (ref 1.8–8)
NEUTS SEG # BLD: 2.3 K/UL (ref 1.8–8)
NEUTS SEG NFR BLD: 51 % (ref 32–75)
NEUTS SEG NFR BLD: 55 % (ref 32–75)
NRBC # BLD: 0 K/UL (ref 0–0.01)
NRBC # BLD: 0.02 K/UL (ref 0–0.01)
NRBC BLD-RTO: 0 PER 100 WBC
NRBC BLD-RTO: 0.5 PER 100 WBC
P-R INTERVAL, ECG05: 164 MS
PLATELET # BLD AUTO: 277 K/UL (ref 150–400)
PLATELET # BLD AUTO: 300 K/UL (ref 150–400)
PMV BLD AUTO: 9.4 FL (ref 8.9–12.9)
PMV BLD AUTO: 9.7 FL (ref 8.9–12.9)
POTASSIUM SERPL-SCNC: 3.6 MMOL/L (ref 3.5–5.1)
PROT SERPL-MCNC: 7.6 G/DL (ref 6.4–8.2)
Q-T INTERVAL, ECG07: 378 MS
QRS DURATION, ECG06: 90 MS
QTC CALCULATION (BEZET), ECG08: 454 MS
RBC # BLD AUTO: 2.83 M/UL (ref 3.8–5.2)
RBC # BLD AUTO: 3.19 M/UL (ref 3.8–5.2)
RBC MORPH BLD: ABNORMAL
SAMPLES BEING HELD,HOLD: NORMAL
SODIUM SERPL-SCNC: 138 MMOL/L (ref 136–145)
THERAPEUTIC RANGE,PTTT: NORMAL SECS (ref 58–77)
TROPONIN I SERPL-MCNC: <0.05 NG/ML
VENTRICULAR RATE, ECG03: 87 BPM
WBC # BLD AUTO: 4.1 K/UL (ref 3.6–11)
WBC # BLD AUTO: 4.1 K/UL (ref 3.6–11)
WBC MORPH BLD: ABNORMAL

## 2019-06-18 PROCEDURE — 74011250636 HC RX REV CODE- 250/636: Performed by: HOSPITALIST

## 2019-06-18 PROCEDURE — 84484 ASSAY OF TROPONIN QUANT: CPT

## 2019-06-18 PROCEDURE — 85025 COMPLETE CBC W/AUTO DIFF WBC: CPT

## 2019-06-18 PROCEDURE — 85730 THROMBOPLASTIN TIME PARTIAL: CPT

## 2019-06-18 PROCEDURE — 93005 ELECTROCARDIOGRAM TRACING: CPT

## 2019-06-18 PROCEDURE — 65660000000 HC RM CCU STEPDOWN

## 2019-06-18 PROCEDURE — 74011636320 HC RX REV CODE- 636/320: Performed by: RADIOLOGY

## 2019-06-18 PROCEDURE — 93970 EXTREMITY STUDY: CPT

## 2019-06-18 PROCEDURE — 99284 EMERGENCY DEPT VISIT MOD MDM: CPT

## 2019-06-18 PROCEDURE — 71275 CT ANGIOGRAPHY CHEST: CPT

## 2019-06-18 PROCEDURE — 36415 COLL VENOUS BLD VENIPUNCTURE: CPT

## 2019-06-18 PROCEDURE — 80053 COMPREHEN METABOLIC PANEL: CPT

## 2019-06-18 PROCEDURE — 74011000258 HC RX REV CODE- 258: Performed by: RADIOLOGY

## 2019-06-18 RX ORDER — NALOXONE HYDROCHLORIDE 0.4 MG/ML
0.4 INJECTION, SOLUTION INTRAMUSCULAR; INTRAVENOUS; SUBCUTANEOUS AS NEEDED
Status: DISCONTINUED | OUTPATIENT
Start: 2019-06-18 | End: 2019-06-21 | Stop reason: HOSPADM

## 2019-06-18 RX ORDER — ONDANSETRON 2 MG/ML
4 INJECTION INTRAMUSCULAR; INTRAVENOUS
Status: DISCONTINUED | OUTPATIENT
Start: 2019-06-18 | End: 2019-06-21 | Stop reason: HOSPADM

## 2019-06-18 RX ORDER — PANTOPRAZOLE SODIUM 40 MG/1
40 TABLET, DELAYED RELEASE ORAL DAILY
Status: DISCONTINUED | OUTPATIENT
Start: 2019-06-19 | End: 2019-06-21 | Stop reason: HOSPADM

## 2019-06-18 RX ORDER — SODIUM CHLORIDE 0.9 % (FLUSH) 0.9 %
10 SYRINGE (ML) INJECTION
Status: COMPLETED | OUTPATIENT
Start: 2019-06-18 | End: 2019-06-18

## 2019-06-18 RX ORDER — SODIUM CHLORIDE 0.9 % (FLUSH) 0.9 %
5-40 SYRINGE (ML) INJECTION EVERY 8 HOURS
Status: DISCONTINUED | OUTPATIENT
Start: 2019-06-18 | End: 2019-06-21 | Stop reason: HOSPADM

## 2019-06-18 RX ORDER — ENOXAPARIN SODIUM 100 MG/ML
70 INJECTION SUBCUTANEOUS
Status: DISCONTINUED | OUTPATIENT
Start: 2019-06-18 | End: 2019-06-18

## 2019-06-18 RX ORDER — HEPARIN SODIUM 10000 [USP'U]/100ML
18-36 INJECTION, SOLUTION INTRAVENOUS
Status: DISCONTINUED | OUTPATIENT
Start: 2019-06-18 | End: 2019-06-20

## 2019-06-18 RX ORDER — SODIUM CHLORIDE 0.9 % (FLUSH) 0.9 %
5-40 SYRINGE (ML) INJECTION AS NEEDED
Status: DISCONTINUED | OUTPATIENT
Start: 2019-06-18 | End: 2019-06-21 | Stop reason: HOSPADM

## 2019-06-18 RX ORDER — HEPARIN SODIUM 5000 [USP'U]/ML
80 INJECTION, SOLUTION INTRAVENOUS; SUBCUTANEOUS ONCE
Status: COMPLETED | OUTPATIENT
Start: 2019-06-18 | End: 2019-06-18

## 2019-06-18 RX ORDER — LANOLIN ALCOHOL/MO/W.PET/CERES
325 CREAM (GRAM) TOPICAL 2 TIMES DAILY WITH MEALS
Status: DISCONTINUED | OUTPATIENT
Start: 2019-06-19 | End: 2019-06-21

## 2019-06-18 RX ADMIN — Medication 10 ML: at 20:50

## 2019-06-18 RX ADMIN — IOPAMIDOL 80 ML: 755 INJECTION, SOLUTION INTRAVENOUS at 16:53

## 2019-06-18 RX ADMIN — SODIUM CHLORIDE 100 ML: 900 INJECTION, SOLUTION INTRAVENOUS at 16:54

## 2019-06-18 RX ADMIN — HEPARIN SODIUM 18 UNITS/KG/HR: 10000 INJECTION, SOLUTION INTRAVENOUS at 19:31

## 2019-06-18 RX ADMIN — HEPARIN SODIUM 6050 UNITS: 5000 INJECTION INTRAVENOUS; SUBCUTANEOUS at 19:31

## 2019-06-18 RX ADMIN — Medication 10 ML: at 16:53

## 2019-06-18 NOTE — H&P
History & Physical    Primary Care Provider: Marie Blackwell NP  Source of Information: Patient     History of Presenting Illness:   Werner Vásquez is a 47 y.o. female who presents with chest pain     47 y.o. female with past medical history significant for Anemia who presents from home with chief complaint of chest pain. Patient reports onset 5 days ago of chest pain, was seen by her PCP 4 days ago who suspected pleurisy, and performed blood work up that resulted today showing elevated D-dimer, and was referred to [facility] ED for PE rule out. Patient presents to [facility] ED today with persisting left sided chest pain that radiates to her back described as \"pulsating. \" Patient reports aggravation of chest pain with deep pleuritic movement. Patient reports accompanying mild nonproductive cough, and mild SOB. Patient endorses history of fibroids, and notes having increased vaginal bleeding and passing clots over the past week. She is still having her period now/  Patient has history of anemia, but denies h/o transfusion. Patient denies recent injury. Pt denies leg swelling, fever, chills, congestion, abdominal pain, nausea, vomiting, diarrhea, difficulty with urination or dysuria. No recent travel. Review of Systems:  General: HPI, no changes of weight  HEENT: no headache, no vision changes, no nose discharge, no hearing changes   RES: HPI   CVS: no cp, no palpitation. Muscular: no joint swelling, no muscle pain, no leg swelling  Skin: no rash, no itching   GI: no vomiting, no diarrhea  : no dysuria, no hematuria  Hemo: no gum bleeding, no petechial   Neuro: no sensation changes, no focal weakness   Endo: no polydipsia   Psych: denied depression     Past Medical History:   Diagnosis Date    Anemia       History reviewed. No pertinent surgical history. Prior to Admission medications    Medication Sig Start Date End Date Taking?  Authorizing Provider   ferrous sulfate 325 mg (65 mg iron) tablet Take 1 Tab by mouth two (2) times daily (with meals). 11/16/17   Nikunj Reddy MD   fluticasone (FLONASE) 50 mcg/actuation nasal spray 2 Sprays by Both Nostrils route daily. 5/23/13   Marah Santana MD   Cholecalciferol, Vitamin D3, (VITAMIN D3) 1,000 unit cap Take  by mouth. Provider, Historical     No Known Allergies   Family History   Problem Relation Age of Onset    No Known Problems Mother     No Known Problems Father     No Known Problems Brother         SOCIAL HISTORY:  Patient resides:  Independently x   Assisted Living    SNF    With family care       Smoking history:   None x   Former    Chronic      Alcohol history:   None x   Social    Chronic      Ambulates:   Independently x   w/cane    w/walker    w/wc    CODE STATUS:  DNR    Full x   Other      Objective:     Physical Exam:     Visit Vitals  /86 (BP 1 Location: Left arm, BP Patient Position: At rest;Sitting)   Pulse 85   Temp 98.2 °F (36.8 °C)   Resp 22   Wt 75.4 kg (166 lb 3.6 oz)   SpO2 99%   BMI 26.83 kg/m²      O2 Device: Room air    General:  Alert, cooperative, no distress, appears stated age. Mild anxious    Head:  Normocephalic, without obvious abnormality, atraumatic. Eyes:  Conjunctivae/corneas clear. PERRL, EOMs intact. Nose: Nares normal. Septum midline. Mucosa normal. No drainage or sinus tenderness. Throat: Lips, mucosa, and tongue normal. Teeth and gums normal.   Neck: Supple, symmetrical, trachea midline, no adenopathy, thyroid: no enlargement/tenderness/nodules, no carotid bruit and no JVD. Back:   Symmetric, no curvature. ROM normal. No CVA tenderness. Lungs:   Clear to auscultation bilaterally. no wheezing    Chest wall:  No tenderness or deformity. Heart:  Regular rate and rhythm, S1, S2 normal, no murmur, click, rub or gallop. Abdomen:   Soft, non-tender. Bowel sounds normal. No masses,  No organomegaly. Extremities: Extremities normal, atraumatic, no cyanosis or edema.  No calf tenderness    Pulses: 2+ and symmetric all extremities. Skin: Skin color, texture, turgor normal. No rashes or lesions   Neurologic: CNII-XII intact. No weakness            Data Review:     Recent Days:  Recent Labs     06/18/19  1207   WBC 4.1   HGB 7.8*   HCT 25.6*        Recent Labs     06/18/19  1207      K 3.6      CO2 28   *   BUN 6   CREA 0.77   CA 8.7   ALB 3.0*   SGOT 16   ALT 17     No results for input(s): PH, PCO2, PO2, HCO3, FIO2 in the last 72 hours. 24 Hour Results:  Recent Results (from the past 24 hour(s))   EKG, 12 LEAD, INITIAL    Collection Time: 06/18/19 11:58 AM   Result Value Ref Range    Ventricular Rate 87 BPM    Atrial Rate 87 BPM    P-R Interval 164 ms    QRS Duration 90 ms    Q-T Interval 378 ms    QTC Calculation (Bezet) 454 ms    Calculated P Axis 71 degrees    Calculated R Axis 22 degrees    Calculated T Axis 54 degrees    Diagnosis       Sinus rhythm with occasional premature ventricular complexes  Nonspecific T wave abnormality  No previous ECGs available  Confirmed by Maye Garcia M.D., [address] [place] [address] ([id number]) on 6/18/2019 1:18:24 PM     CBC WITH AUTOMATED DIFF    Collection Time: 06/18/19 12:07 PM   Result Value Ref Range    WBC 4.1 3.6 - 11.0 K/uL    RBC 3.19 (L) 3.80 - 5.20 M/uL    HGB 7.8 (L) 11.5 - 16.0 g/dL    HCT 25.6 (L) 35.0 - 47.0 %    MCV 80.3 80.0 - 99.0 FL    MCH 24.5 (L) 26.0 - 34.0 PG    MCHC 30.5 30.0 - 36.5 g/dL    RDW 17.6 (H) 11.5 - 14.5 %    PLATELET 787 858 - 463 K/uL    MPV 9.4 8.9 - 12.9 FL    NRBC 0.0 0  WBC    ABSOLUTE NRBC 0.00 0.00 - 0.01 K/uL    NEUTROPHILS 55 32 - 75 %    LYMPHOCYTES 34 12 - 49 %    MONOCYTES 6 5 - 13 %    EOSINOPHILS 4 0 - 7 %    BASOPHILS 1 0 - 1 %    IMMATURE GRANULOCYTES 0 0.0 - 0.5 %    ABS. NEUTROPHILS 2.3 1.8 - 8.0 K/UL    ABS. LYMPHOCYTES 1.4 0.8 - 3.5 K/UL    ABS. MONOCYTES 0.2 0.0 - 1.0 K/UL    ABS. EOSINOPHILS 0.2 0.0 - 0.4 K/UL    ABS. BASOPHILS 0.0 0.0 - 0.1 K/UL    ABS. IMM.  GRANS. 0.0 0.00 - 0.04 K/UL    DF AUTOMATED     METABOLIC PANEL, COMPREHENSIVE    Collection Time: 06/18/19 12:07 PM   Result Value Ref Range    Sodium 138 136 - 145 mmol/L    Potassium 3.6 3.5 - 5.1 mmol/L    Chloride 106 97 - 108 mmol/L    CO2 28 21 - 32 mmol/L    Anion gap 4 (L) 5 - 15 mmol/L    Glucose 114 (H) 65 - 100 mg/dL    BUN 6 6 - 20 MG/DL    Creatinine 0.77 0.55 - 1.02 MG/DL    BUN/Creatinine ratio 8 (L) 12 - 20      GFR est AA >60 >60 ml/min/1.73m2    GFR est non-AA >60 >60 ml/min/1.73m2    Calcium 8.7 8.5 - 10.1 MG/DL    Bilirubin, total 0.3 0.2 - 1.0 MG/DL    ALT (SGPT) 17 12 - 78 U/L    AST (SGOT) 16 15 - 37 U/L    Alk. phosphatase 90 45 - 117 U/L    Protein, total 7.6 6.4 - 8.2 g/dL    Albumin 3.0 (L) 3.5 - 5.0 g/dL    Globulin 4.6 (H) 2.0 - 4.0 g/dL    A-G Ratio 0.7 (L) 1.1 - 2.2     SAMPLES BEING HELD    Collection Time: 06/18/19 12:07 PM   Result Value Ref Range    SAMPLES BEING HELD 1BLU     COMMENT        Add-on orders for these samples will be processed based on acceptable specimen integrity and analyte stability, which may vary by analyte. TROPONIN I    Collection Time: 06/18/19 12:07 PM   Result Value Ref Range    Troponin-I, Qt. <0.05 <0.05 ng/mL         Imaging:   Cta Chest W Or W Wo Cont    Result Date: 6/18/2019  Impression: 1. Small to moderate volume of acute bilateral pulmonary emboli essentially involving all lobes as above. Findings suggestive of possible right heart strain. Correlation with echocardiogram should be considered. 2. Left lower lobe subsegmental atelectasis, with a more focal consolidative opacity posteriorly, which may represent a pulmonary infarct. The findings were called to Judson Saucedo RN on 6/18/2019 at 5:22 PM by Dr. Tomy Mohs. 359       Assessment:     Active Problems:    Pulmonary embolism ([address] (6/18/2019)           Plan:     1. Acute bilat pulmonary embolism: situation is difficulty due to her anemia/fibroids bleeding. Will start heparin drip.  If makes her fibroids bleeding worse/none stop, will stop. Consult hematologist and gyn. futher long term anticoagulation will be decided based on futher test.   Abd/pelvic CT r/o malignancy and eval fibroids   Venous doppler looking for DVTs  Echo to eval RV   2.  Chronic anemia: may need transfusion if HB < 7.   3. Fibroids/dysfunctional uterine bleeding: consult gyn        Signed By: Teressa De La Garza MD     June 18, 2019

## 2019-06-18 NOTE — ED PROVIDER NOTES
47 y.o. female with past medical history significant for Anemia who presents from home with chief complaint of chest pain. Patient reports onset 5 days ago of chest pain, was seen by her PCP 4 days ago who suspected pleurisy, and performed blood work up that resulted today showing elevated D-dimer, and was referred to [facility] ED for PE rule out. Patient presents to [facility] ED today with persisting left sided chest pain that radiates to her back described as \"pulsating. \" Patient reports aggravation of chest pain with deep pleuritic movement. Patient reports accompanying mild nonproductive cough, and mild SOB. Patient endorses history of fibroids, and notes having increased vaginal bleeding and passing clots over the past week. Patient has history of anemia, but denies h/o transfusion. Patient denies recent injury. Pt denies leg swelling, fever, chills, congestion, abdominal pain, nausea, vomiting, diarrhea, difficulty with urination or dysuria. There are no other acute medical concerns at this time. PCP: Esther Hankins NP    Note written by Viridiana Hernandez, as dictated by Lianet Gan MD 3:40 PM      The history is provided by the patient. Past Medical History:   Diagnosis Date    Anemia        History reviewed. No pertinent surgical history.       Family History:   Problem Relation Age of Onset    No Known Problems Mother     No Known Problems Father     No Known Problems Brother        Social History     Socioeconomic History    Marital status: SINGLE     Spouse name: Not on file    Number of children: Not on file    Years of education: Not on file    Highest education level: Not on file   Occupational History    Not on file   Social Needs    Financial resource strain: Not on file    Food insecurity:     Worry: Not on file     Inability: Not on file    Transportation needs:     Medical: Not on file     Non-medical: Not on file   Tobacco Use    Smoking status: Never Smoker    Smokeless tobacco: Never Used   Substance and Sexual Activity    Alcohol use: No    Drug use: No    Sexual activity: Yes     Partners: Male     Birth control/protection: None   Lifestyle    Physical activity:     Days per week: Not on file     Minutes per session: Not on file    Stress: Not on file   Relationships    Social connections:     Talks on phone: Not on file     Gets together: Not on file     Attends [employer] service: Not on file     Active member of club or organization: Not on file     Attends meetings of clubs or organizations: Not on file     Relationship status: Not on file    Intimate partner violence:     Fear of current or ex partner: Not on file     Emotionally abused: Not on file     Physically abused: Not on file     Forced sexual activity: Not on file   Other Topics Concern    Not on file   Social History Narrative    Not on file         ALLERGIES: Patient has no known allergies. Review of Systems   Constitutional: Negative for chills and fever. Respiratory: Positive for cough and shortness of breath. Cardiovascular: Positive for chest pain. Negative for leg swelling. Gastrointestinal: Negative for abdominal pain and vomiting. Genitourinary: Positive for vaginal bleeding. Musculoskeletal: Positive for back pain. All other systems reviewed and are negative. Vitals:    06/18/19 1154 06/18/19 1400   BP:  140/86   Pulse: 96 85   Resp:  22   Temp:  98.2 °F (36.8 °C)   SpO2: 97% 99%            Physical Exam   Constitutional: She is oriented to person, place, and time. She appears well-developed and well-nourished. No distress. HENT:   Head: Normocephalic. Mouth/Throat: Oropharynx is clear and moist.   Eyes: Pupils are equal, round, and reactive to light. Conjunctivae and EOM are normal.   Neck: Normal range of motion. Neck supple. No JVD present. Cardiovascular: Normal rate, regular rhythm, normal heart sounds and intact distal pulses.    Pulmonary/Chest: Effort normal and breath sounds normal.   Abdominal: Soft. Bowel sounds are normal. She exhibits no distension. There is no tenderness. Musculoskeletal: Normal range of motion. She exhibits no edema, tenderness or deformity. Lymphadenopathy:     She has no cervical adenopathy. Neurological: She is alert and oriented to person, place, and time. No cranial nerve deficit or sensory deficit. Skin: Skin is warm and dry. Capillary refill takes less than 2 seconds. No rash noted. She is not diaphoretic. No erythema. Psychiatric: She has a normal mood and affect. Nursing note and vitals reviewed. Note written by Viridiana Guy, as dictated by Bridger Carlson MD 3:40 PM      MDM       Procedures    05:26 PM  Consult to hospitalist via [facility] Text for admission for bilateral PE with right heart strain and possible pulmonary infarct. lovenox given in ED.     Dr. Valdez Service accepts admission and will assign to hospitalist.    Garret Morales MD

## 2019-06-18 NOTE — ROUTINE PROCESS
Bedside and Verbal shift change report given to Enoc Sim (oncoming nurse) by Nikos Jernigan (offgoing nurse).  Report included the following information SBAR, Intake/Output, MAR and Cardiac Rhythm SR.

## 2019-06-18 NOTE — PROGRESS NOTES
TRANSFER - IN REPORT:    Verbal report received from Emmy(name) on Tolu Cornea  being received from ER(unit) for routine progression of care      Report consisted of patients Situation, Background, Assessment and   Recommendations(SBAR). Information from the following report(s) SBAR, ED Summary, Intake/Output, MAR and Cardiac Rhythm SR was reviewed with the receiving nurse. Opportunity for questions and clarification was provided. Assessment completed upon patients arrival to unit and care assumed.

## 2019-06-19 ENCOUNTER — APPOINTMENT (OUTPATIENT)
Dept: CT IMAGING | Age: 49
DRG: 176 | End: 2019-06-19
Attending: HOSPITALIST
Payer: COMMERCIAL

## 2019-06-19 ENCOUNTER — APPOINTMENT (OUTPATIENT)
Dept: NON INVASIVE DIAGNOSTICS | Age: 49
DRG: 176 | End: 2019-06-19
Attending: HOSPITALIST
Payer: COMMERCIAL

## 2019-06-19 LAB
ALBUMIN SERPL-MCNC: 2.7 G/DL (ref 3.5–5)
ALBUMIN/GLOB SERPL: 0.6 {RATIO} (ref 1.1–2.2)
ALP SERPL-CCNC: 81 U/L (ref 45–117)
ALT SERPL-CCNC: 17 U/L (ref 12–78)
ANION GAP SERPL CALC-SCNC: 5 MMOL/L (ref 5–15)
APTT PPP: 55.6 SEC (ref 22.1–32)
APTT PPP: 65.4 SEC (ref 22.1–32)
APTT PPP: 99.2 SEC (ref 22.1–32)
AST SERPL-CCNC: 16 U/L (ref 15–37)
BASOPHILS # BLD: 0 K/UL (ref 0–0.1)
BASOPHILS NFR BLD: 1 % (ref 0–1)
BILIRUB SERPL-MCNC: 0.2 MG/DL (ref 0.2–1)
BUN SERPL-MCNC: 7 MG/DL (ref 6–20)
BUN/CREAT SERPL: 10 (ref 12–20)
CALCIUM SERPL-MCNC: 8.4 MG/DL (ref 8.5–10.1)
CHLORIDE SERPL-SCNC: 108 MMOL/L (ref 97–108)
CO2 SERPL-SCNC: 26 MMOL/L (ref 21–32)
CREAT SERPL-MCNC: 0.72 MG/DL (ref 0.55–1.02)
DIFFERENTIAL METHOD BLD: ABNORMAL
ECHO AV PEAK GRADIENT: 8.1 MMHG
ECHO AV PEAK VELOCITY: 142.13 CM/S
ECHO EST RA PRESSURE: 5 MMHG
ECHO LA MAJOR AXIS: 2.76 CM
ECHO LV E' LATERAL VELOCITY: 10.95 CM/S
ECHO LV E' SEPTAL VELOCITY: 8.81 CM/S
ECHO LV INTERNAL DIMENSION DIASTOLIC: 5.11 CM (ref 3.9–5.3)
ECHO LV INTERNAL DIMENSION SYSTOLIC: 3.27 CM
ECHO LV IVSD: 0.79 CM (ref 0.6–0.9)
ECHO LV MASS 2D: 182.5 G (ref 67–162)
ECHO LV POSTERIOR WALL DIASTOLIC: 0.96 CM (ref 0.6–0.9)
ECHO LVOT PEAK GRADIENT: 6.2 MMHG
ECHO LVOT PEAK VELOCITY: 124.8 CM/S
ECHO MV A VELOCITY: 62.75 CM/S
ECHO MV AREA PHT: 4.2 CM2
ECHO MV E DECELERATION TIME (DT): 179.1 MS
ECHO MV E VELOCITY: 73.41 CM/S
ECHO MV E/A RATIO: 1.17
ECHO MV E/E' LATERAL: 6.7
ECHO MV E/E' RATIO (AVERAGED): 7.52
ECHO MV E/E' SEPTAL: 8.33
ECHO MV PRESSURE HALF TIME (PHT): 51.9 MS
ECHO PULMONARY ARTERY SYSTOLIC PRESSURE (PASP): 33.4 MMHG
ECHO PV MAX VELOCITY: 68.59 CM/S
ECHO PV PEAK GRADIENT: 1.9 MMHG
ECHO RIGHT VENTRICULAR SYSTOLIC PRESSURE (RVSP): 33.4 MMHG
ECHO RV INTERNAL DIMENSION: 2.46 CM
ECHO TV REGURGITANT MAX VELOCITY: 266.38 CM/S
ECHO TV REGURGITANT PEAK GRADIENT: 28.4 MMHG
EOSINOPHIL # BLD: 0.3 K/UL (ref 0–0.4)
EOSINOPHIL NFR BLD: 6 % (ref 0–7)
ERYTHROCYTE [DISTWIDTH] IN BLOOD BY AUTOMATED COUNT: 17.6 % (ref 11.5–14.5)
GLOBULIN SER CALC-MCNC: 4.3 G/DL (ref 2–4)
GLUCOSE BLD STRIP.AUTO-MCNC: 125 MG/DL (ref 65–100)
GLUCOSE SERPL-MCNC: 110 MG/DL (ref 65–100)
HCT VFR BLD AUTO: 23.5 % (ref 35–47)
HGB BLD-MCNC: 7.4 G/DL (ref 11.5–16)
IMM GRANULOCYTES # BLD AUTO: 0 K/UL (ref 0–0.04)
IMM GRANULOCYTES NFR BLD AUTO: 0 % (ref 0–0.5)
LYMPHOCYTES # BLD: 2.3 K/UL (ref 0.8–3.5)
LYMPHOCYTES NFR BLD: 45 % (ref 12–49)
MCH RBC QN AUTO: 24.9 PG (ref 26–34)
MCHC RBC AUTO-ENTMCNC: 31.5 G/DL (ref 30–36.5)
MCV RBC AUTO: 79.1 FL (ref 80–99)
MONOCYTES # BLD: 0.4 K/UL (ref 0–1)
MONOCYTES NFR BLD: 8 % (ref 5–13)
NEUTS SEG # BLD: 2.1 K/UL (ref 1.8–8)
NEUTS SEG NFR BLD: 40 % (ref 32–75)
NRBC # BLD: 0 K/UL (ref 0–0.01)
NRBC BLD-RTO: 0 PER 100 WBC
PLATELET # BLD AUTO: 291 K/UL (ref 150–400)
PMV BLD AUTO: 9.4 FL (ref 8.9–12.9)
POTASSIUM SERPL-SCNC: 3.4 MMOL/L (ref 3.5–5.1)
PROT SERPL-MCNC: 7 G/DL (ref 6.4–8.2)
RBC # BLD AUTO: 2.97 M/UL (ref 3.8–5.2)
SERVICE CMNT-IMP: ABNORMAL
SODIUM SERPL-SCNC: 139 MMOL/L (ref 136–145)
THERAPEUTIC RANGE,PTTT: ABNORMAL SECS (ref 58–77)
WBC # BLD AUTO: 5.1 K/UL (ref 3.6–11)

## 2019-06-19 PROCEDURE — 93306 TTE W/DOPPLER COMPLETE: CPT

## 2019-06-19 PROCEDURE — 65660000000 HC RM CCU STEPDOWN

## 2019-06-19 PROCEDURE — 85730 THROMBOPLASTIN TIME PARTIAL: CPT

## 2019-06-19 PROCEDURE — 36415 COLL VENOUS BLD VENIPUNCTURE: CPT

## 2019-06-19 PROCEDURE — 74177 CT ABD & PELVIS W/CONTRAST: CPT

## 2019-06-19 PROCEDURE — 85025 COMPLETE CBC W/AUTO DIFF WBC: CPT

## 2019-06-19 PROCEDURE — 74011636320 HC RX REV CODE- 636/320: Performed by: RADIOLOGY

## 2019-06-19 PROCEDURE — 74011250637 HC RX REV CODE- 250/637: Performed by: HOSPITALIST

## 2019-06-19 PROCEDURE — 74011250637 HC RX REV CODE- 250/637: Performed by: FAMILY MEDICINE

## 2019-06-19 PROCEDURE — 74011250637 HC RX REV CODE- 250/637: Performed by: NURSE PRACTITIONER

## 2019-06-19 PROCEDURE — 74011250636 HC RX REV CODE- 250/636: Performed by: HOSPITALIST

## 2019-06-19 PROCEDURE — 80053 COMPREHEN METABOLIC PANEL: CPT

## 2019-06-19 PROCEDURE — 82962 GLUCOSE BLOOD TEST: CPT

## 2019-06-19 RX ORDER — POTASSIUM CHLORIDE 750 MG/1
40 TABLET, FILM COATED, EXTENDED RELEASE ORAL
Status: COMPLETED | OUTPATIENT
Start: 2019-06-19 | End: 2019-06-19

## 2019-06-19 RX ORDER — SODIUM CHLORIDE 0.9 % (FLUSH) 0.9 %
10 SYRINGE (ML) INJECTION
Status: COMPLETED | OUTPATIENT
Start: 2019-06-19 | End: 2019-06-19

## 2019-06-19 RX ORDER — ACETAMINOPHEN 325 MG/1
650 TABLET ORAL
Status: DISCONTINUED | OUTPATIENT
Start: 2019-06-19 | End: 2019-06-21 | Stop reason: HOSPADM

## 2019-06-19 RX ADMIN — ACETAMINOPHEN 650 MG: 325 TABLET ORAL at 19:28

## 2019-06-19 RX ADMIN — IOPAMIDOL 100 ML: 755 INJECTION, SOLUTION INTRAVENOUS at 14:20

## 2019-06-19 RX ADMIN — PANTOPRAZOLE SODIUM 40 MG: 40 TABLET, DELAYED RELEASE ORAL at 08:56

## 2019-06-19 RX ADMIN — POTASSIUM CHLORIDE 40 MEQ: 750 TABLET, FILM COATED, EXTENDED RELEASE ORAL at 10:27

## 2019-06-19 RX ADMIN — Medication 10 ML: at 20:55

## 2019-06-19 RX ADMIN — HEPARIN SODIUM 16 UNITS/KG/HR: 10000 INJECTION, SOLUTION INTRAVENOUS at 16:30

## 2019-06-19 RX ADMIN — FERROUS SULFATE TAB 325 MG (65 MG ELEMENTAL FE) 325 MG: 325 (65 FE) TAB at 08:13

## 2019-06-19 RX ADMIN — Medication 10 ML: at 04:29

## 2019-06-19 RX ADMIN — Medication 10 ML: at 16:24

## 2019-06-19 RX ADMIN — HEPARIN SODIUM 16 UNITS/KG/HR: 10000 INJECTION, SOLUTION INTRAVENOUS at 16:42

## 2019-06-19 RX ADMIN — Medication 10 ML: at 16:23

## 2019-06-19 RX ADMIN — FERROUS SULFATE TAB 325 MG (65 MG ELEMENTAL FE) 325 MG: 325 (65 FE) TAB at 16:23

## 2019-06-19 NOTE — PROGRESS NOTES
Hospitalist Progress Note  Leonora Colon MD  Answering service: [phone] from in house phone        Date of Service:  2019  NAME:  Erica Gregorio  :  1970  MRN:  [id number]      Admission Summary:   47 y.o. female with past medical history significant for Anemia who presents from home with chief complaint of chest pain. Patient reports onset 5 days ago of chest pain, was seen by her PCP 4 days ago who suspected pleurisy, and performed blood work up that resulted today showing elevated D-dimer, and was referred to [facility] ED for PE rule out. Patient presents to [facility] ED today with persisting left sided chest pain that radiates to her back described as \"pulsating. \" Patient reports aggravation of chest pain with deep pleuritic movement. Patient reports accompanying mild nonproductive cough, and mild SOB. Patient endorses history of fibroids, and notes having increased vaginal bleeding and passing clots over the past week. She is still having her period now/  Patient has history of anemia, but denies h/o transfusion. Interval history / Subjective:   Patient still reports some shortness of breath and left chest discomfort  Stable SO2 on RA  Awaiting further recs from hematology     Assessment & Plan:     1. Acute bilat pulmonary embolism in the setting of mod-severe anemia:   start heparin drip. Monitor uterine bleeding   Consult hematologist for anemia workup and med recs for DC  Abd/pelvic CT negative for malignancy  Venous doppler negative for DVTs- noted were bilateral inguinal nodes  Echo was essentially normal with borderline elevated PaPressure-no clear evidence of right heart strain   2. Anemia- unclear etiology- hematology eval  Iron levels from 2017 were normal  She may need transfusion if HB < 7. Monitor H&H  Hb 7.8 on admission- prior Hb was 10.3 in 18  3.  Fibroids/dysfunctional uterine bleeding: consulted her gyn but they want her to follow up in the office for eval  4. Inguinal nodes on LE dopplers- recommend surveillance with repeat US in 3 months    Code status: FULL  DVT prophylaxis: Heparin    Care Plan discussed with: Patient/Family  Disposition: Home w/Family and TBD     Hospital Problems  Date Reviewed: 5/23/2013          Codes Class Noted POA    * (Principal) Pulmonary embolism ([address] ICD-10-CM: I26.99  ICD-9-CM: 415.19  6/18/2019 Yes                Review of Systems:   A comprehensive review of systems was negative except for that written in the HPI. Vital Signs:    Last 24hrs VS reviewed since prior progress note. Most recent are:  Visit Vitals  /86   Pulse 76   Temp 98.8 °F (37.1 °C)   Resp 16   Wt 74.8 kg (165 lb)   SpO2 100%   Breastfeeding? No   BMI 26.63 kg/m²         Intake/Output Summary (Last 24 hours) at 6/19/2019 1706  Last data filed at 6/19/2019 0719  Gross per 24 hour   Intake 422 ml   Output    Net 422 ml        Physical Examination:             Constitutional:  No acute distress, cooperative, pleasant    ENT:  Oral mucous moist, oropharynx benign. Neck supple,    Resp:  CTA bilaterally. No wheezing/rhonchi/rales. No accessory muscle use   CV:  Regular rhythm, normal rate, no murmurs, gallops, rubs    GI:  Soft, non distended, non tender. normoactive bowel sounds, no hepatosplenomegaly     Musculoskeletal:  No edema, warm, 2+ pulses throughout    Neurologic:  Moves all extremities.   AAOx3, CN II-XII reviewed     Psych:  normal mood and affect       Data Review:    Review and/or order of tests in the radiology section of CPT  Review and/or order of tests in the medicine section of CPT      Labs:     Recent Labs     06/19/19  0015 06/18/19  1851   WBC 5.1 4.1   HGB 7.4* 7.1*   HCT 23.5* 23.4*    277     Recent Labs     06/19/19  0015 06/18/19  1207    138   K 3.4* 3.6    106   CO2 26 28   BUN 7 6   CREA 0.72 0.77   * 114*   CA 8.4* 8.7     Recent Labs 06/19/19  0015 06/18/19  1207   SGOT 16 16   ALT 17 17   AP 81 90   TBILI 0.2 0.3   TP 7.0 7.6   ALB 2.7* 3.0*   GLOB 4.3* 4.6*     Recent Labs     06/19/19  1515 06/19/19  0749 06/19/19  0015   APTT 55.6* 65.4* 99.2*      No results for input(s): FE, TIBC, PSAT, FERR in the last 72 hours. No results found for: FOL, RBCF   No results for input(s): PH, PCO2, PO2 in the last 72 hours.   Recent Labs     06/18/19  1207   TROIQ <0.05     Lab Results   Component Value Date/Time    Cholesterol, total 174 11/16/2017 09:30 AM    HDL Cholesterol 91 11/16/2017 09:30 AM    LDL, calculated 75 11/16/2017 09:30 AM    Triglyceride 40 11/16/2017 09:30 AM     No results found for: GLUCPOC  No results found for: COLOR, APPRN, SPGRU, REFSG, BECK, PROTU, GLUCU, KETU, BILU, UROU, MERVAT, LEUKU, GLUKE, EPSU, BACTU, WBCU, RBCU, CASTS, UCRY      Medications Reviewed:     Current Facility-Administered Medications   Medication Dose Route Frequency    sodium chloride 0.9 % bolus infusion 100 mL  100 mL IntraVENous RAD ONCE    iohexol (OMNIPAQUE) 240 mg iodine/mL solution 50 mL  50 mL Oral RAD ONCE    heparin 25,000 units in D5W 250 ml infusion  18-36 Units/kg/hr IntraVENous TITRATE    ferrous sulfate tablet 325 mg  325 mg Oral BID WITH MEALS    pantoprazole (PROTONIX) tablet 40 mg  40 mg Oral DAILY    sodium chloride (NS) flush 5-40 mL  5-40 mL IntraVENous Q8H    sodium chloride (NS) flush 5-40 mL  5-40 mL IntraVENous PRN    naloxone (NARCAN) injection 0.4 mg  0.4 mg IntraVENous PRN    ondansetron (ZOFRAN) injection 4 mg  4 mg IntraVENous Q4H PRN     ______________________________________________________________________  EXPECTED LENGTH OF STAY: 2d 19h  ACTUAL LENGTH OF STAY:          1                 Nguyễn Moses MD

## 2019-06-19 NOTE — CDMP QUERY
#1 
 
 
 
Pt admitted with PE /Pt noted to have a some right heart strain on CTA Richardson Sloan If possible, please document in the progress notes and d/c summary if you are evaluatting and / or treating any of the following: 
 
? PE with Acute Cor Pulmonale ? PE without Acute Cor Pulmonale  
? Other, please specify ? Clinically unable to determine The medical record reflects the following: 
   Risk Factors: PE 
   Clinical Indicators: CTA- Small to moderate volume of acute bilateral pulmonary emboli essentially 
involving all lobes as above. Findings suggestive of possible right heart 
strain. Correlation with echocardiogram should be considered Treatment: lovenox Thank you, 
       Neftali Cowart [facility], [address] [facility]

## 2019-06-19 NOTE — ROUTINE PROCESS
Bedside and Verbal shift change report given to Tiffany Bradley (oncoming nurse) by Ranjith Ojeda (offgoing nurse).  Report included the following information SBAR, Intake/Output, MAR and Cardiac Rhythm SR.

## 2019-06-19 NOTE — PROGRESS NOTES
Care Management Interventions  PCP Verified by CM: Aj Trejo MD)  Transition of Care Consult (CM Consult): Other(None)  MyChart Signup: No  Discharge Durable Medical Equipment: No  Physical Therapy Consult: No  Occupational Therapy Consult: No  Speech Therapy Consult: No  Current Support Network: Other  Confirm Follow Up Transport: Family  Plan discussed with Pt/Family/Caregiver: Yes  Freedom of Choice Offered: (N/A)  Discharge Location  Discharge Placement: Home with family assistance    Reason for Admission:       Acute bilateral pulmonary emboli                   RRAT Score:         5            Plan for utilizing home health: None                         Current Advanced Directive/Advance Care Plan: Not on file. Transition of Care Plan:      CM met with patient's daughter Emelia lOmos at the bedside. Patient was off the floor for a diagnostic study. Patient's adult daughter Emelia Olmos and adult son Abdoul Gonzalez lives with patient. 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. Per daughter, patient is ambulatory and expects return to independent functioning. Patient's daughter confirmed PCP, health insurance, and prescription coverage. Transport at discharge will be provided by family. No discharge planning needs presented at this time. CM to follow for discharge planning needs.

## 2019-06-20 LAB
ANION GAP SERPL CALC-SCNC: 6 MMOL/L (ref 5–15)
APTT PPP: 65 SEC (ref 22.1–32)
APTT PPP: 67.5 SEC (ref 22.1–32)
APTT PPP: 80.9 SEC (ref 22.1–32)
BLOOD GROUP ANTIBODIES SERPL: NORMAL
BUN SERPL-MCNC: 6 MG/DL (ref 6–20)
BUN/CREAT SERPL: 8 (ref 12–20)
CALCIUM SERPL-MCNC: 8.6 MG/DL (ref 8.5–10.1)
CHLORIDE SERPL-SCNC: 108 MMOL/L (ref 97–108)
CO2 SERPL-SCNC: 25 MMOL/L (ref 21–32)
COMMENT, HOLDF: NORMAL
CREAT SERPL-MCNC: 0.75 MG/DL (ref 0.55–1.02)
DAT POLY-SP REAG RBC QL: NORMAL
FERRITIN SERPL-MCNC: 14 NG/ML (ref 26–388)
FOLATE SERPL-MCNC: 16 NG/ML (ref 5–21)
GLUCOSE SERPL-MCNC: 79 MG/DL (ref 65–100)
HAPTOGLOB SERPL-MCNC: 138 MG/DL (ref 30–200)
HCT VFR BLD AUTO: 25.1 % (ref 35–47)
HGB BLD-MCNC: 7.5 G/DL (ref 11.5–16)
IGA SERPL-MCNC: 722 MG/DL (ref 70–400)
IGG SERPL-MCNC: 1830 MG/DL (ref 700–1600)
IGM SERPL-MCNC: 39 MG/DL (ref 40–230)
INR PPP: 1 (ref 0.9–1.1)
IRON SATN MFR SERPL: 5 % (ref 20–50)
IRON SERPL-MCNC: 15 UG/DL (ref 35–150)
LDH SERPL L TO P-CCNC: 270 U/L (ref 81–246)
POTASSIUM SERPL-SCNC: 4.1 MMOL/L (ref 3.5–5.1)
PROTHROMBIN TIME: 9.8 SEC (ref 9–11.1)
RETICS # AUTO: 0.04 M/UL (ref 0.02–0.08)
RETICS/RBC NFR AUTO: 1.3 % (ref 0.7–2.1)
SAMPLES BEING HELD,HOLD: NORMAL
SODIUM SERPL-SCNC: 139 MMOL/L (ref 136–145)
THERAPEUTIC RANGE,PTTT: ABNORMAL SECS (ref 58–77)
TIBC SERPL-MCNC: 296 UG/DL (ref 250–450)
VIT B12 SERPL-MCNC: 691 PG/ML (ref 193–986)

## 2019-06-20 PROCEDURE — 81240 F2 GENE: CPT

## 2019-06-20 PROCEDURE — 86147 CARDIOLIPIN ANTIBODY EA IG: CPT

## 2019-06-20 PROCEDURE — 82607 VITAMIN B-12: CPT

## 2019-06-20 PROCEDURE — 82784 ASSAY IGA/IGD/IGG/IGM EACH: CPT

## 2019-06-20 PROCEDURE — 83010 ASSAY OF HAPTOGLOBIN QUANT: CPT

## 2019-06-20 PROCEDURE — 83883 ASSAY NEPHELOMETRY NOT SPEC: CPT

## 2019-06-20 PROCEDURE — 82746 ASSAY OF FOLIC ACID SERUM: CPT

## 2019-06-20 PROCEDURE — 85730 THROMBOPLASTIN TIME PARTIAL: CPT

## 2019-06-20 PROCEDURE — 74011250636 HC RX REV CODE- 250/636: Performed by: HOSPITALIST

## 2019-06-20 PROCEDURE — 74011250637 HC RX REV CODE- 250/637: Performed by: NURSE PRACTITIONER

## 2019-06-20 PROCEDURE — 74011000258 HC RX REV CODE- 258: Performed by: FAMILY MEDICINE

## 2019-06-20 PROCEDURE — 65660000000 HC RM CCU STEPDOWN

## 2019-06-20 PROCEDURE — 80048 BASIC METABOLIC PNL TOTAL CA: CPT

## 2019-06-20 PROCEDURE — 82728 ASSAY OF FERRITIN: CPT

## 2019-06-20 PROCEDURE — 74011250637 HC RX REV CODE- 250/637: Performed by: FAMILY MEDICINE

## 2019-06-20 PROCEDURE — 85018 HEMOGLOBIN: CPT

## 2019-06-20 PROCEDURE — 84155 ASSAY OF PROTEIN SERUM: CPT

## 2019-06-20 PROCEDURE — 85045 AUTOMATED RETICULOCYTE COUNT: CPT

## 2019-06-20 PROCEDURE — 36415 COLL VENOUS BLD VENIPUNCTURE: CPT

## 2019-06-20 PROCEDURE — 83540 ASSAY OF IRON: CPT

## 2019-06-20 PROCEDURE — 86146 BETA-2 GLYCOPROTEIN ANTIBODY: CPT

## 2019-06-20 PROCEDURE — 83615 LACTATE (LD) (LDH) ENZYME: CPT

## 2019-06-20 PROCEDURE — 86880 COOMBS TEST DIRECT: CPT

## 2019-06-20 PROCEDURE — 74011250636 HC RX REV CODE- 250/636: Performed by: FAMILY MEDICINE

## 2019-06-20 PROCEDURE — 74011250637 HC RX REV CODE- 250/637: Performed by: HOSPITALIST

## 2019-06-20 PROCEDURE — 85610 PROTHROMBIN TIME: CPT

## 2019-06-20 PROCEDURE — 81241 F5 GENE: CPT

## 2019-06-20 RX ORDER — WARFARIN SODIUM 5 MG/1
5 TABLET ORAL
Status: DISCONTINUED | OUTPATIENT
Start: 2019-06-20 | End: 2019-06-20 | Stop reason: DRUGHIGH

## 2019-06-20 RX ORDER — ENOXAPARIN SODIUM 100 MG/ML
1 INJECTION SUBCUTANEOUS EVERY 12 HOURS
Status: DISCONTINUED | OUTPATIENT
Start: 2019-06-20 | End: 2019-06-21 | Stop reason: HOSPADM

## 2019-06-20 RX ADMIN — FERROUS SULFATE TAB 325 MG (65 MG ELEMENTAL FE) 325 MG: 325 (65 FE) TAB at 08:40

## 2019-06-20 RX ADMIN — ACETAMINOPHEN 650 MG: 325 TABLET ORAL at 22:14

## 2019-06-20 RX ADMIN — HEPARIN SODIUM 14 UNITS/KG/HR: 10000 INJECTION, SOLUTION INTRAVENOUS at 17:15

## 2019-06-20 RX ADMIN — IRON SUCROSE 200 MG: 20 INJECTION, SOLUTION INTRAVENOUS at 15:42

## 2019-06-20 RX ADMIN — ENOXAPARIN SODIUM 70 MG: 60 INJECTION SUBCUTANEOUS at 22:16

## 2019-06-20 RX ADMIN — Medication 10 ML: at 07:05

## 2019-06-20 RX ADMIN — PANTOPRAZOLE SODIUM 40 MG: 40 TABLET, DELAYED RELEASE ORAL at 09:01

## 2019-06-20 RX ADMIN — FERROUS SULFATE TAB 325 MG (65 MG ELEMENTAL FE) 325 MG: 325 (65 FE) TAB at 17:15

## 2019-06-20 RX ADMIN — Medication 10 ML: at 15:44

## 2019-06-20 RX ADMIN — Medication 10 ML: at 22:17

## 2019-06-20 RX ADMIN — WARFARIN SODIUM 7.5 MG: 5 TABLET ORAL at 22:14

## 2019-06-20 NOTE — PROGRESS NOTES
Patient wanting to start coumadin- for PE treatment-  will start coumadin tonight and DC heparin drip and start lovenox bridge.

## 2019-06-20 NOTE — PROGRESS NOTES
Hospitalist Progress Note  Nicko Holloway MD  Answering service: [phone] from in house phone        Date of Service:  2019  NAME:  Amira Vickers  :  1970  MRN:  [id number]      Admission Summary:   47 y.o. female with past medical history significant for Anemia who presents from home with chief complaint of chest pain. Patient reports onset 5 days ago of chest pain, was seen by her PCP 4 days ago who suspected pleurisy, and performed blood work up that resulted today showing elevated D-dimer, and was referred to [facility] ED for PE rule out. Patient presents to [facility] ED today with persisting left sided chest pain that radiates to her back described as \"pulsating. \" Patient reports aggravation of chest pain with deep pleuritic movement. Patient reports accompanying mild nonproductive cough, and mild SOB. Patient endorses history of fibroids, and notes having increased vaginal bleeding and passing clots over the past week. She is still having her period now/  Patient has history of anemia, but denies h/o transfusion. Interval history / Subjective:   Patient still reports some shortness of breath and left chest discomfort  Stable SO2 on RA  Awaiting further recs from hematology but patient leaning toward starting coumadin for her oral anticoagulation     Assessment & Plan:     1. Acute bilat pulmonary embolism in the setting of mod-severe anemia:   cont heparin drip. Minimal uterine bleeding   Consult hematologist for anemia workup and med recs for DC  Abd/pelvic CT negative for malignancy  Venous doppler negative for DVTs- noted were bilateral inguinal nodes  Echo was essentially normal with borderline elevated PaPressure-no clear evidence of right heart strain   2.  Anemia- due to iron defic and uterine blood loss- hematology eval  Iron levels this admission were very low, they were normal in 2017   Start IV iron  She may need transfusion if HB < 7. Monitor H&H  Hb 7.5 today; Hb was 10.3 in 2/13/18  3. Fibroids/dysfunctional uterine bleeding: consulted her gyn but they want her to follow up in the office for eval  4. Inguinal nodes on LE dopplers- recommend surveillance with repeat US in 3 months and /or outpatient surgical eval  5. Hx of elevated Anti SSA antibiodies - suggestive of possible rheumatological dz- per her PCP's office    Code status: FULL  DVT prophylaxis: Heparin    Care Plan discussed with: Patient/Family  Disposition: Home w/Family and TBD     Hospital Problems  Date Reviewed: 5/23/2013          Codes Class Noted POA    * (Principal) Pulmonary embolism ([facility] [address] ICD-10-CM: I26.99  ICD-9-CM: 415.19  6/18/2019 Yes                Review of Systems:   A comprehensive review of systems was negative except for that written in the HPI. Vital Signs:    Last 24hrs VS reviewed since prior progress note. Most recent are:  Visit Vitals  /72 (BP 1 Location: Right arm, BP Patient Position: Sitting)   Pulse 78   Temp 98.8 °F (37.1 °C)   Resp 16   Wt 71.9 kg (158 lb 8.2 oz)   SpO2 100%   Breastfeeding? No   BMI 25.58 kg/m²         Intake/Output Summary (Last 24 hours) at 6/20/2019 1421  Last data filed at 6/20/2019 5439  Gross per 24 hour   Intake 360 ml   Output    Net 360 ml        Physical Examination:             Constitutional:  No acute distress, cooperative, pleasant    ENT:  Oral mucous moist, oropharynx benign. Neck supple,    Resp:  CTA bilaterally. No wheezing/rhonchi/rales. No accessory muscle use   CV:  Regular rhythm, normal rate, no murmurs, gallops, rubs    GI:  Soft, non distended, non tender. normoactive bowel sounds, no hepatosplenomegaly     Musculoskeletal:  No edema, warm, 2+ pulses throughout    Neurologic:  Moves all extremities.   AAOx3, CN II-XII reviewed     Psych:  normal mood and affect       Data Review:    Review and/or order of tests in the radiology section of CPT  Review and/or order of tests in the medicine section of [facility]      Labs:     Recent Labs     06/20/19  0624 06/19/19  0015 06/18/19  1851   WBC  --  5.1 4.1   HGB 7.5* 7.4* 7.1*   HCT 25.1* 23.5* 23.4*   PLT  --  291 277     Recent Labs     06/20/19  0624 06/19/19  0015 06/18/19  1207    139 138   K 4.1 3.4* 3.6    108 106   CO2 25 26 28   BUN 6 7 6   CREA 0.75 0.72 0.77   GLU 79 110* 114*   CA 8.6 8.4* 8.7     Recent Labs     06/19/19  0015 06/18/19  1207   SGOT 16 16   ALT 17 17   AP 81 90   TBILI 0.2 0.3   TP 7.0 7.6   ALB 2.7* 3.0*   GLOB 4.3* 4.6*     Recent Labs     06/20/19  1229 06/20/19  0624 06/19/19  2326   APTT 65.0* 67.5* 80.9*      Recent Labs     06/20/19  0624   TIBC 296   PSAT 5*   FERR 14*      No results found for: FOL, RBCF   No results for input(s): PH, PCO2, PO2 in the last 72 hours.   Recent Labs     06/18/19  1207   TROIQ <0.05     Lab Results   Component Value Date/Time    Cholesterol, total 174 11/16/2017 09:30 AM    HDL Cholesterol 91 11/16/2017 09:30 AM    LDL, calculated 75 11/16/2017 09:30 AM    Triglyceride 40 11/16/2017 09:30 AM     Lab Results   Component Value Date/Time    Glucose (POC) 125 (H) 06/19/2019 10:12 PM     No results found for: COLOR, APPRN, SPGRU, REFSG, BECK, PROTU, GLUCU, KETU, BILU, UROU, MERVAT, LEUKU, GLUKE, EPSU, BACTU, WBCU, RBCU, CASTS, UCRY      Medications Reviewed:     Current Facility-Administered Medications   Medication Dose Route Frequency    iron sucrose (VENOFER) 100 mg iron/5 mL injection 200 mg  200 mg IntraVENous DAILY    acetaminophen (TYLENOL) tablet 650 mg  650 mg Oral Q6H PRN    heparin 25,000 units in D5W 250 ml infusion  18-36 Units/kg/hr IntraVENous TITRATE    ferrous sulfate tablet 325 mg  325 mg Oral BID WITH MEALS    pantoprazole (PROTONIX) tablet 40 mg  40 mg Oral DAILY    sodium chloride (NS) flush 5-40 mL  5-40 mL IntraVENous Q8H    sodium chloride (NS) flush 5-40 mL  5-40 mL IntraVENous PRN    naloxone (NARCAN) injection 0.4 mg  0.4 mg IntraVENous PRN  ondansetron (ZOFRAN) injection 4 mg  4 mg IntraVENous Q4H PRN     ______________________________________________________________________  EXPECTED LENGTH OF STAY: 2d 19h  ACTUAL LENGTH OF STAY:          2                 Cesilia Austin MD

## 2019-06-20 NOTE — CONSULTS
Patient seen, chart reviewed, note dictated. [id number]    46 y/o woman with a h/o fibroids and menorrhagia, admitted now with dyspnea, found to have multiple PE. Asked to see to discuss anemia and treatment of PE. Seen in our clinic previously by Dr. Tigre Mehta with work-up at that time showing an elevated JILLIAN with plans for referral to Rheumatology. Patient unable to make that appointment. Tentative current plan is to transition to oral coumadin given patient's concern about NOAC cost.     1. Anemia: historical iron studies were low, however the last one in our office was high. Iron studies here low. I suspect the one in our office was incorrect and she has iron def anemia from menorrhagia. For completeness' sake, will check LDH, haptoglobin, Coomb's, retic count, SPEP, SFLC, quant Igs, B-12. I've asked her to make an appointment to f/u with Dr. Tigre Mehta in our office on discharge. 2. PE: Diff dx includes antiphospholipid syndrome given her elevated JILLIAN. Hypercoaguable work-up is somewhat limited by current anticoagulation, but can still do ACL Ab, B2G Ab, FVL gene testing and PGV gene testing which I've sent off. Agree with heparin/lovenox bridge to coumadin. Given she is sat'ing well on room air, okay from our standpoint for discharge home with lovenox and INR check in our clinic on Monday. Thank you for consult, will follow daily while here.      Lm Paulson MD  Hem/Onc

## 2019-06-21 VITALS
DIASTOLIC BLOOD PRESSURE: 85 MMHG | RESPIRATION RATE: 17 BRPM | HEART RATE: 90 BPM | WEIGHT: 160.94 LBS | TEMPERATURE: 98.5 F | BODY MASS INDEX: 25.98 KG/M2 | SYSTOLIC BLOOD PRESSURE: 142 MMHG | OXYGEN SATURATION: 100 %

## 2019-06-21 LAB
ANION GAP SERPL CALC-SCNC: 6 MMOL/L (ref 5–15)
BUN SERPL-MCNC: 6 MG/DL (ref 6–20)
BUN/CREAT SERPL: 8 (ref 12–20)
CALCIUM SERPL-MCNC: 8.9 MG/DL (ref 8.5–10.1)
CHLORIDE SERPL-SCNC: 109 MMOL/L (ref 97–108)
CO2 SERPL-SCNC: 22 MMOL/L (ref 21–32)
CREAT SERPL-MCNC: 0.77 MG/DL (ref 0.55–1.02)
ERYTHROCYTE [DISTWIDTH] IN BLOOD BY AUTOMATED COUNT: 17.7 % (ref 11.5–14.5)
GLUCOSE SERPL-MCNC: 85 MG/DL (ref 65–100)
HCT VFR BLD AUTO: 26.2 % (ref 35–47)
HGB BLD-MCNC: 7.9 G/DL (ref 11.5–16)
INR PPP: 1 (ref 0.9–1.1)
MCH RBC QN AUTO: 23.9 PG (ref 26–34)
MCHC RBC AUTO-ENTMCNC: 30.2 G/DL (ref 30–36.5)
MCV RBC AUTO: 79.4 FL (ref 80–99)
NRBC # BLD: 0 K/UL (ref 0–0.01)
NRBC BLD-RTO: 0 PER 100 WBC
PLATELET # BLD AUTO: 308 K/UL (ref 150–400)
PMV BLD AUTO: 9.1 FL (ref 8.9–12.9)
POTASSIUM SERPL-SCNC: 4 MMOL/L (ref 3.5–5.1)
PROTHROMBIN TIME: 10.4 SEC (ref 9–11.1)
RBC # BLD AUTO: 3.3 M/UL (ref 3.8–5.2)
SODIUM SERPL-SCNC: 137 MMOL/L (ref 136–145)
WBC # BLD AUTO: 4.6 K/UL (ref 3.6–11)

## 2019-06-21 PROCEDURE — 74011000258 HC RX REV CODE- 258: Performed by: FAMILY MEDICINE

## 2019-06-21 PROCEDURE — 74011250636 HC RX REV CODE- 250/636: Performed by: FAMILY MEDICINE

## 2019-06-21 PROCEDURE — 36415 COLL VENOUS BLD VENIPUNCTURE: CPT

## 2019-06-21 PROCEDURE — 85610 PROTHROMBIN TIME: CPT

## 2019-06-21 PROCEDURE — 74011250637 HC RX REV CODE- 250/637: Performed by: HOSPITALIST

## 2019-06-21 PROCEDURE — 74011250637 HC RX REV CODE- 250/637: Performed by: FAMILY MEDICINE

## 2019-06-21 PROCEDURE — 85027 COMPLETE CBC AUTOMATED: CPT

## 2019-06-21 PROCEDURE — 80048 BASIC METABOLIC PNL TOTAL CA: CPT

## 2019-06-21 RX ORDER — ACETAMINOPHEN 325 MG/1
650 TABLET ORAL
Qty: 60 TAB | Refills: 0 | Status: SHIPPED
Start: 2019-06-21

## 2019-06-21 RX ORDER — ENOXAPARIN SODIUM 100 MG/ML
1 INJECTION SUBCUTANEOUS EVERY 12 HOURS
Qty: 1 SYRINGE | Refills: 0 | Status: SHIPPED | OUTPATIENT
Start: 2019-06-21 | End: 2019-06-21

## 2019-06-21 RX ORDER — WARFARIN SODIUM 5 MG/1
7.5 TABLET ORAL
Qty: 100 TAB | Refills: 2 | Status: SHIPPED | OUTPATIENT
Start: 2019-06-21 | End: 2019-09-19

## 2019-06-21 RX ORDER — ENOXAPARIN SODIUM 100 MG/ML
70 INJECTION SUBCUTANEOUS EVERY 12 HOURS
Qty: 14 SYRINGE | Refills: 1 | Status: SHIPPED | OUTPATIENT
Start: 2019-06-21 | End: 2019-06-28

## 2019-06-21 RX ORDER — WARFARIN SODIUM 5 MG/1
7.5 TABLET ORAL
Qty: 100 TAB | Refills: 2 | Status: SHIPPED | OUTPATIENT
Start: 2019-06-21 | End: 2019-06-21 | Stop reason: SDUPTHER

## 2019-06-21 RX ORDER — WARFARIN 7.5 MG/1
7.5 TABLET ORAL ONCE
Status: COMPLETED | OUTPATIENT
Start: 2019-06-21 | End: 2019-06-21

## 2019-06-21 RX ADMIN — PANTOPRAZOLE SODIUM 40 MG: 40 TABLET, DELAYED RELEASE ORAL at 08:33

## 2019-06-21 RX ADMIN — Medication 10 ML: at 08:33

## 2019-06-21 RX ADMIN — FERROUS SULFATE TAB 325 MG (65 MG ELEMENTAL FE) 325 MG: 325 (65 FE) TAB at 08:33

## 2019-06-21 RX ADMIN — Medication 10 ML: at 14:46

## 2019-06-21 RX ADMIN — WARFARIN SODIUM 7.5 MG: 7.5 TABLET ORAL at 17:30

## 2019-06-21 RX ADMIN — ENOXAPARIN SODIUM 70 MG: 60 INJECTION SUBCUTANEOUS at 09:49

## 2019-06-21 RX ADMIN — IRON SUCROSE 200 MG: 20 INJECTION, SOLUTION INTRAVENOUS at 14:45

## 2019-06-21 NOTE — PROGRESS NOTES
A Spiritual Care Partner Volunteer visited patient in Rm 448 on 6/21/2019.   Documented by:  Chaplain Wheatley MDiv, MS, [place] [id number] PRAY ([id number])

## 2019-06-21 NOTE — ROUTINE PROCESS
Bedside and Verbal shift change report given to Justina Salcido (oncoming nurse) by Aurora Hoffmann (offgoing nurse). Report included the following information SBAR, Intake/Output, MAR and Cardiac Rhythm NSR. From: Josephine Monday  To: Lenora Amin MD  Sent: 4/20/2017 2:02 PM CDT  Subject: Other    4.20.17    Dr Nessa Pierce    Thanks again Doctor    Rita Elias

## 2019-06-21 NOTE — PROGRESS NOTES
WORK NOTE  Nelli Gaytan MD  Answering service: [phone] from in house phone        Date of Service:  2019  NAME:  Ana Laura Joseph  :  1970  MRN:  [id number]    To whom it may concern:     The patient listed above was hospitalized and should be excused from work from 19 through 19 for medical reasons  She is cleared to return to work on 19.       _________________________     Nelli Gaytan MD

## 2019-06-21 NOTE — PROGRESS NOTES
Bedside and Verbal shift change report given to Mari Shrestha (oncoming nurse) by Kalen Edward (offgoing nurse). Report included the following information SBAR, Intake/Output, MAR and Cardiac Rhythm NSR.

## 2019-06-21 NOTE — CONSULTS
[address]    Name:  ADDIS Sutton  MR#:  [id number]  :  1970  ACCOUNT #:  [de-identified]  DATE OF SERVICE:  2019      REASON FOR ADMISSION:  Pulmonary emboli. REASON FOR CONSULTATION:  Pulmonary emboli and anemia. HISTORY OF PRESENT ILLNESS:  The patient is a very charming 79-year-old woman. She was in her normal state of health until approximately 20 years ago when she began to suffer from uterine fibroids and chronic anemia. She was referred to Dr. Olman Srivastava to discuss microcytic anemia. She was last seen by Dr. Tigre Mehta in 2018. Her referral lab showed a low iron saturation, low iron, elevated TIBC; however, in our clinic, her iron saturation was surprisingly 74%. Dr. Tigre Mehta did additional workup including an JILLIAN which was elevated as well as elevated IgG, elevated IgA, elevated lambda and kappa with normal ratio, elevated RNP, and elevated SSA antibiotics. For this reason, Dr. Tigre Mehta recommended a referral to rheumatology for further evaluation and treatment. Unfortunately, the patient ultimately declined that consult. More recently, the patient has had difficulty with shortness of breath and a cough. Ultimately, it became so bad that she presented to the ER for further evaluation and treatment. She had imaging here that included a CAT scan of the chest, which was done on 2019. Unfortunately, this revealed small-to-moderate volume of acute bilateral pulmonary emboli including in the distal right main pulmonary artery and extending into the right upper lobe segmental and subsegmental arteries into the right middle lobe segmental artery and into the right lower lobe segmental artery, particularly in the posterior segment.   On the left, there were emboli seen in the anterior segmental artery of the left upper lobe, segmental emboli in the inferior lingual artery, and scattered subsegmental and segmental emboli involving the left lower lobe pulmonary arteries. There was enlargement of the right atrium and IVC with mild flattening of the interventricular septum. She was placed on IV heparin, and our service was consulted due to the concern for anticoagulation with her low hemoglobin. On admission here, she was noted to have a hemoglobin of 7.8. She had additional testing here including iron studies. This showed her iron saturation to be 5, iron 15, TIBC of 296, and a ferritin of 14. For that reason, she was ordered 200 mg of Venofer IV, which she is receiving now. She denies any family history of blood clot. She denies any recent immobilization from surgery or travel. She is not taking hormone replacement therapy. PAST MEDICAL HISTORY:  Allergic rhinitis. ALLERGIES:  NO KNOWN DRUG ALLERGIES. CURRENT MEDICATIONS:  Heparin drip, ferrous sulfate 325 mg p.o. b.i.d., iron sucrose as above, and Protonix 40 mg once daily. SOCIAL HISTORY:  She is single. She does not use tobacco.  She works for [employer]. She states she is quite busy, and in her off time, she mostly sleeps. FAMILY HISTORY:  No family history of blood clots. REVIEW OF SYSTEMS:  CONSTITUTIONAL:  There are no known fevers or chills. HEENT:  No auditory or visual change. LYMPHATIC:  No lumps or bumps in neck, under arm, or groin. CARDIOVASCULAR:  No chest pain or palpitations. PULMONARY:  As above. GASTROINTESTINAL:  No abdominal pain, diarrhea, or constipation. GENITOURINARY:  No dysuria or incontinence. SKIN:  No rash or changing mole. MUSCULOSKELETAL:  No red or swollen joints. NEUROLOGIC:  No irritability or syncope. PHYSICAL EXAMINATION:  VITAL SIGNS:  Blood pressure 108/72, pulse 78, temperature 98.3, and saturating 100% on room air. GENERAL:  Pleasant, in no acute distress. Speaking in full sentences without the use of accessory muscles. HEENT:  Sclerae anicteric. Oropharynx clear. NECK:  Supple without lymphadenopathy or thyromegaly.   HEART: Regular rate and rhythm without murmur, rub, or gallop. LUNGS:  Clear to auscultation bilaterally. ABDOMEN:  Nontender and nondistended. Normal bowel sounds. No hepatosplenomegaly. EXTREMITIES:  No clubbing, cyanosis, or edema. PSYCHIATRY:  Normal mood and affect. Alert and oriented x3. LABORATORY DATA:  Additional imaging studies included an ultrasound of the lower extremities, which showed no evidence of lower extremity clot, but did show a questionable 3.2 x 0.4 cm prominent lymph node growing in the left system. Because of that, she had a subsequent CAT scan of the abdomen and pelvis that failed to show any abnormalities in the inguinal lymph node system. Normal size spleen. No retroperitoneal lymphadenopathy. ASSESSMENT AND PLAN:  A 79-year-old woman with a history of fibroids and menorrhagia admitted now with dyspnea, found to have multiple pulmonary emboli. I was asked to discuss anemia and treatment of pulmonary embolus. Seen in our clinic previously by Dr. Dayton Mortimer with workup at that time showing an elevated JILLIAN with plans for referral to Rheumatology. The patient unable to make that appointment. Alternative current plan is  to transition to oral Coumadin given the patient's concern about novel oral anticoagulant cost.  1.  Anemia:  Historical iron studies were low, however, the last one she had in our office was high. Given her iron studies here are low, I suspect the one in our office may have been incorrect and she has had iron deficiency anemia from menorrhagia. Agree with IV iron. For completeness sake, we will check LDH, haptoglobin, Michael, reticulocyte count, SPEP, serum free light chains, quantitative immunoglobulins, and B12. I have asked her to make an appointment to follow up with Dr. Dayton Mortimer in our office on discharge. 2.  Pulmonary emboli:  Differential diagnosis includes antiphospholipid syndrome given her elevated JILLIAN.   Hypercoagulable workup is somewhat limited by her current anticoagulation, but we can still do antibody testing for anticardiolipin and beta-2 glycoprotein and gene testing for factor V Leiden and prothrombin gene mutation, which  out. I agree with the heparin and Lovenox bridge to Coumadin. Given she is saturating well on room air, it is okay from my standpoint for discharge home with Lovenox and INR checks in our clinic on Monday. Thank you for consult. We will follow daily while she is here. MD CAMERON Hauser/[email or web address]  D:  06/20/2019 17:15  T:  06/20/2019 20:18  JOB #:  [id number]  CC: Rafaela Mosquera MD

## 2019-06-21 NOTE — PROGRESS NOTES
Pharmacist Note - Warfarin Dosing  Consult provided for this 47 y. o.female to manage warfarin for Pulmonary Embolism w/hx anemia    INR Goal: 2 - 3    Drugs that may increase INR: None  Drugs that may decrease INR: None  Other current anticoagulants/ drugs that may increase bleeding risk: Enoxaparin  Risk factors: None  Daily INR ordered: YES    Recent Labs     06/21/19  0422 06/20/19  1935 06/20/19  0624 06/19/19  0015   HGB 7.9*  --  7.5* 7.4*   INR 1.0 1.0  --   --      Date               INR                  Dose  6/20        1.0             7.5 mg  6/21        1.0             7.5 mg                                                                               Assessment/ Plan: Will order warfarin 7.5 mg PO x 1 dose. Pharmacy will continue to monitor daily and adjust therapy as indicated. Please contact the pharmacist at x [phone] for outpatient recommendations if needed.

## 2019-06-21 NOTE — PROGRESS NOTES
Problem: Pulmonary Embolism Care Plan (Adult)  Goal: *Improvement of existing pulmonary embolism  Outcome: Progressing Towards Goal  Goal: *Absence of bleeding  Outcome: Progressing Towards Goal  Goal: *Labs within defined limits  Outcome: Progressing Towards Goal     Problem: Patient Education: Go to Patient Education Activity  Goal: Patient/Family Education  Outcome: Progressing Towards Goal     Problem: Falls - Risk of  Goal: *Absence of Falls  Description  Document Kelly Latin Fall Risk and appropriate interventions in the flowsheet.   Outcome: Progressing Towards Goal     Problem: Patient Education: Go to Patient Education Activity  Goal: Patient/Family Education  Outcome: Progressing Towards Goal

## 2019-06-21 NOTE — DISCHARGE SUMMARY
Discharge Summary       PATIENT ID: Amira Vickers  MRN: [id number]   YOB: 1970    DATE OF ADMISSION: 6/18/2019  3:17 PM    DATE OF DISCHARGE: 6/21/19 5:30pm  PRIMARY CARE PROVIDER: Ulises Del Rio NP     ATTENDING PHYSICIAN: Frankie Kapoor  DISCHARGING PROVIDER: Nicko Holloway MD    To contact this individual call [phone] and ask the  to page. If unavailable ask to be transferred the Adult Hospitalist Department. CONSULTATIONS: IP CONSULT TO HOSPITALIST  IP CONSULT TO HEMATOLOGY  IP CONSULT TO HEMATOLOGY  IP CONSULT TO OB GYN    PROCEDURES/SURGERIES: * No surgery found *    ADMITTING [address] [place] [address] COURSE:   47 y.o. female with past medical history significant for Anemia who presents from home with chief complaint of chest pain. Patient reports onset 5 days ago of chest pain, was seen by her PCP 4 days ago who suspected pleurisy, and performed blood work up that resulted today showing elevated D-dimer, and was referred to [facility] ED for PE rule out. Patient presents to [facility] ED today with persisting left sided chest pain that radiates to her back described as \"pulsating. \" Patient reports aggravation of chest pain with deep pleuritic movement. Patient reports accompanying mild nonproductive cough, and mild SOB. Patient endorses history of fibroids, and notes having increased vaginal bleeding and passing clots over the past week. She is still having her period now/  Patient has history of anemia, but denies h/o transfusion. DISCHARGE DIAGNOSES / PLAN:      1. Acute bilat pulmonary embolism in the setting of mod-severe anemia:   cont heparin drip. Minimal uterine bleeding   Consult hematologist for anemia workup and med recs for DC  Abd/pelvic CT negative for malignancy  Venous doppler negative for DVTs- noted were bilateral inguinal nodes  Echo was essentially normal with borderline elevated PaPressure-no clear evidence of right heart strain   2.  Anemia- due to iron defic and uterine blood loss- hematology eval  Iron levels this admission were very low, they were normal in 2017   Start IV iron  She may need transfusion if HB < 7.   Monitor H&H  Hb 7.9 today; Hb was 10.3 in 2/13/18  3. Fibroids/dysfunctional uterine bleeding: consulted her gyn but they want her to follow up in the office for eval  4. Inguinal nodes on LE dopplers- recommend surveillance with repeat US in 3 months and /or outpatient surgical eval  5. Hx of elevated Anti SSA antibiodies - suggestive of possible rheumatological dz- per her PCP's office     ADDITIONAL CARE RECOMMENDATIONS: Work note given- return to work 1 week from Monday (7/1/19)    PENDING TEST RESULTS:   At the time of discharge the following test results are still pending: bloodwork ordered by hematology    FOLLOW UP APPOINTMENTS:    Follow-up Information     Follow up With Specialties Details Why Contact Info    Marie Blackwell NP Nurse Practitioner On 6/27/2019 Hospital f/u PCP appointment Thursday, 6/26/19 @ 10:30 a.m.  [address] [id number]      Radhames Shaw MD Hematology and Oncology Schedule an appointment as soon as possible for a visit in 3 days for INR checks, coumadin management,  and for follow up on lab work done in the hospital [address] [place] [address]  [phone]      rheumatology  In 1 month for eval regarding labs positive for SSA antibodies     Marie Blackwell NP Nurse Practitioner In 3 months for follow up ultrasound of enlarged inguinal lymph nodes [address] [place] [address]  [phone]               DIET: regular- with coumadin restrictions    ACTIVITY: Activity as tolerated    WOUND CARE: NA    EQUIPMENT needed: none      DISCHARGE MEDICATIONS:  Current Discharge Medication List      START taking these medications    Details   enoxaparin (LOVENOX) 80 mg/0.8 mL injection 70 mg by SubCUTAneous route every twelve (12) hours for 7 days.   Qty: 14 Syringe, Refills: 1      acetaminophen (TYLENOL) 325 mg tablet Take 2 Tabs by mouth every four (4) hours as needed for Pain. Qty: 60 Tab, Refills: 0      warfarin (COUMADIN) 5 mg tablet Take 2 Tabs by mouth daily (with dinner) for 90 days. Take 1 1/2 tabs daily for 7.5mg dose- have MD adjust dose for INR 2-3  Duration of therapy- 6 months  Qty: 100 Tab, Refills: 2         CONTINUE these medications which have NOT CHANGED    Details   ferrous sulfate 325 mg (65 mg iron) tablet Take 1 Tab by mouth two (2) times daily (with meals). Qty: 60 Tab, Refills: 5      fluticasone (FLONASE) 50 mcg/actuation nasal spray 2 Sprays by Both Nostrils route daily. Qty: 1 Bottle, Refills: 5      Cholecalciferol, Vitamin D3, (VITAMIN D3) 1,000 unit cap Take  by mouth. NOTIFY YOUR PHYSICIAN FOR ANY OF THE FOLLOWING:   Fever over 101 degrees for 24 hours. Chest pain, shortness of breath, fever, chills, nausea, vomiting, diarrhea, change in mentation, falling, weakness, bleeding. Severe pain or pain not relieved by medications. Or, any other signs or symptoms that you may have questions about.     DISPOSITION:   X Home    OT  PT  HH  RN       Long term SNF/Inpatient Rehab    Independent/assisted living    Hospice    Other:       PATIENT CONDITION AT DISCHARGE:     Functional status    Poor     Deconditioned    X Independent      Cognition   X  Lucid     Forgetful     Dementia      Catheters/lines (plus indication)    Thomas     PICC     PEG    X None      Code status   X  Full code     DNR      PHYSICAL EXAMINATION AT DISCHARGE:    Patient Vitals for the past 24 hrs:   Temp Pulse Resp BP SpO2   06/21/19 1644 98.5 °F (36.9 °C) 90 17 142/85 100 %   06/21/19 1244 98.9 °F (37.2 °C) 84 16 139/90 100 %   06/21/19 0827 98.6 °F (37 °C) 85 16 (!) 123/92 99 %   06/21/19 0424 98 °F (36.7 °C) 73 18 138/81 99 %   06/20/19 2354 98 °F (36.7 °C) 78 16 122/56 99 %   06/20/19 1938 98.2 °F (36.8 °C) 86 16 148/81 100 % Constitutional:  No acute distress, cooperative, pleasant    ENT:  Oral mucous moist, oropharynx benign. Neck supple,    Resp:  CTA bilaterally. No wheezing/rhonchi/rales. No accessory muscle use   CV:  Regular rhythm, normal rate, no murmurs, gallops, rubs    GI:  Soft, non distended, non tender. normoactive bowel sounds, no hepatosplenomegaly     Musculoskeletal:  No edema, warm, 2+ pulses throughout    Neurologic:  Moves all extremities. AAOx3, CN II-XII reviewed                         Psych:  normal mood and affect    Recent Results (from the past 24 hour(s))   LD    Collection Time: 06/20/19  7:04 PM   Result Value Ref Range     (H) 81 - 246 U/L   HAPTOGLOBIN    Collection Time: 06/20/19  7:04 PM   Result Value Ref Range    Haptoglobin 138 30 - 200 mg/dL   DIRECT & INDIRECT ELYSSA    Collection Time: 06/20/19  7:04 PM   Result Value Ref Range    JENNIE Poly NEG     Antibody screen NEG    IMMUNOGLOBULINS, G/A/M, QT.     Collection Time: 06/20/19  7:04 PM   Result Value Ref Range    Immunoglobulin G 1,830 (H) 700 - 1,600 mg/dL    Immunoglobulin A 722 (H) 70 - 400 mg/dL    Immunoglobulin M 39 (L) 40 - 230 mg/dL   VITAMIN B12    Collection Time: 06/20/19  7:04 PM   Result Value Ref Range    Vitamin B12 691 193 - 986 pg/mL   RETICULOCYTE COUNT    Collection Time: 06/20/19  7:04 PM   Result Value Ref Range    Reticulocyte count 1.3 0.7 - 2.1 %    Absolute Retic Cnt. 0.0427 0.0164 - 0.0776 M/ul   PROTHROMBIN TIME + INR    Collection Time: 06/20/19  7:35 PM   Result Value Ref Range    INR 1.0 0.9 - 1.1      Prothrombin time 9.8 9.0 - 11.1 sec   PROTHROMBIN TIME + INR    Collection Time: 06/21/19  4:22 AM   Result Value Ref Range    INR 1.0 0.9 - 1.1      Prothrombin time 10.4 9.0 - 11.1 sec   CBC W/O DIFF    Collection Time: 06/21/19  4:22 AM   Result Value Ref Range    WBC 4.6 3.6 - 11.0 K/uL    RBC 3.30 (L) 3.80 - 5.20 M/uL    HGB 7.9 (L) 11.5 - 16.0 g/dL    HCT 26.2 (L) 35.0 - 47.0 % MCV 79.4 (L) 80.0 - 99.0 FL    MCH 23.9 (L) 26.0 - 34.0 PG    MCHC 30.2 30.0 - 36.5 g/dL    RDW 17.7 (H) 11.5 - 14.5 %    PLATELET 182 613 - 590 K/uL    MPV 9.1 8.9 - 12.9 FL    NRBC 0.0 0  WBC    ABSOLUTE NRBC 0.00 0.00 - 2.20 K/uL   METABOLIC PANEL, BASIC    Collection Time: 06/21/19  4:22 AM   Result Value Ref Range    Sodium 137 136 - 145 mmol/L    Potassium 4.0 3.5 - 5.1 mmol/L    Chloride 109 (H) 97 - 108 mmol/L    CO2 22 21 - 32 mmol/L    Anion gap 6 5 - 15 mmol/L    Glucose 85 65 - 100 mg/dL    BUN 6 6 - 20 MG/DL    Creatinine 0.77 0.55 - 1.02 MG/DL    BUN/Creatinine ratio 8 (L) 12 - 20      GFR est AA >60 >60 ml/min/1.73m2    GFR est non-AA >60 >60 ml/min/1.73m2    Calcium 8.9 8.5 - 10.1 MG/DL         CHRONIC MEDICAL DIAGNOSES:  Problem List as of 6/21/2019 Date Reviewed: 5/23/2013          Codes Class Noted - Resolved    * (Principal) Pulmonary embolism [facility] ICD-10-CM: I26.99  ICD-9-CM: 415.19  6/18/2019 - Present        TMJ (temporomandibular joint syndrome) ICD-10-CM: M26.609  ICD-9-CM: 524.60  2/7/2013 - Present        Palpable lymph node ICD-10-CM: R59.9  ICD-9-CM: 785.6  1/7/2013 - Present    Overview Signed 1/7/2013  3:53 PM by Armand Land     Benign left intramammary   Lymph node, left axillary lymph node             Fibroids ICD-10-CM: D21.9  ICD-9-CM: 215.9  1/7/2013 - Present    Overview Signed 4/13/2018  8:48 AM by Torrie Oquendo March 2018             Unspecified vitamin D deficiency ICD-10-CM: E55.9  ICD-9-CM: 268.9  1/7/2013 - Present        Anemia ICD-10-CM: D64.9  ICD-9-CM: 285.9  1/7/2013 - Present        Palpitations ICD-10-CM: R00.2  ICD-9-CM: 785.1  1/7/2013 - Present        Tobacco use disorder ICD-10-CM: F17.200  ICD-9-CM: 305.1  1/7/2013 - Present        RESOLVED: TMJ (dislocation of temporomandibular joint) ICD-10-CM: S03. 00XA  ICD-9-CM: 830.0  1/7/2013 - 2/7/2013              Greater than 40 minutes were spent with the patient on counseling and coordination of care    Signed:   Karen Bojorquez MD  6/21/2019  5:32 PM

## 2019-06-21 NOTE — PROGRESS NOTES
Pharmacist Note - Warfarin Dosing  Consult provided for this 47 y. o.female to manage warfarin for Pulmonary Embolism    INR Goal: 2 - 3      Drugs that may increase INR: None  Drugs that may decrease INR: None  Other current anticoagulants/ drugs that may increase bleeding risk: Enoxaparin  Risk factors: None  Daily INR ordered: YES    Recent Labs     06/20/19  1935 06/20/19  0624 06/19/19  0015 06/18/19  1851   HGB  --  7.5* 7.4* 7.1*   INR 1.0  --   --   --      Date               INR                  Dose  6/20        1.0             7.5 mg                                                                               Assessment/ Plan: Will order warfarin 7.5 mg PO x 1 dose. Pharmacy will continue to monitor daily and adjust therapy as indicated. Please contact the pharmacist at x [phone] for outpatient recommendations if needed.

## 2019-06-21 NOTE — PROGRESS NOTES
Hospital follow-up PCP transitional care appointment has been scheduled with Dr. Suad Patel for Thursday, 6/26/19 at 10:30 a.m. Pending patient discharge.   Derian Chau, Care Management Specialist.

## 2019-06-23 LAB
ALBUMIN SERPL ELPH-MCNC: 3.5 G/DL (ref 2.9–4.4)
ALBUMIN/GLOB SERPL: 0.9 {RATIO} (ref 0.7–1.7)
ALPHA1 GLOB SERPL ELPH-MCNC: 0.3 G/DL (ref 0–0.4)
ALPHA2 GLOB SERPL ELPH-MCNC: 0.6 G/DL (ref 0.4–1)
B-GLOBULIN SERPL ELPH-MCNC: 1.5 G/DL (ref 0.7–1.3)
GAMMA GLOB SERPL ELPH-MCNC: 1.4 G/DL (ref 0.4–1.8)
GLOBULIN SER CALC-MCNC: 3.8 G/DL (ref 2.2–3.9)
M PROTEIN SERPL ELPH-MCNC: ABNORMAL G/DL
PROT SERPL-MCNC: 7.3 G/DL (ref 6–8.5)

## 2019-06-24 LAB
B2 GLYCOPROT1 IGG SER-ACNC: <9 GPI IGG UNITS (ref 0–20)
B2 GLYCOPROT1 IGM SER-ACNC: <9 GPI IGM UNITS (ref 0–32)
CARDIOLIPIN IGG SER IA-ACNC: <9 GPL U/ML (ref 0–14)
CARDIOLIPIN IGM SER IA-ACNC: <9 MPL U/ML (ref 0–12)
F2 GENE MUT ANL BLD/T: NORMAL
KAPPA LC FREE SER-MCNC: 38.9 MG/L (ref 3.3–19.4)
KAPPA LC FREE/LAMBDA FREE SER: 1.09 {RATIO} (ref 0.26–1.65)
LAMBDA LC FREE SERPL-MCNC: 35.6 MG/L (ref 5.7–26.3)

## 2019-07-01 LAB — F5 GENE MUT ANL BLD/T: NORMAL

## 2019-07-30 ENCOUNTER — TELEPHONE (OUTPATIENT)
Dept: FAMILY MEDICINE CLINIC | Age: 49
End: 2019-07-30

## 2019-07-30 NOTE — TELEPHONE ENCOUNTER
Patient called to get contact information for the previous GYN she was referred to. Gave patient information.

## 2020-06-23 ENCOUNTER — APPOINTMENT (OUTPATIENT)
Dept: CT IMAGING | Age: 50
End: 2020-06-23
Attending: EMERGENCY MEDICINE
Payer: COMMERCIAL

## 2020-06-23 ENCOUNTER — APPOINTMENT (OUTPATIENT)
Dept: ULTRASOUND IMAGING | Age: 50
End: 2020-06-23
Attending: EMERGENCY MEDICINE
Payer: COMMERCIAL

## 2020-06-23 ENCOUNTER — HOSPITAL ENCOUNTER (EMERGENCY)
Age: 50
Discharge: HOME | End: 2020-06-23
Attending: EMERGENCY MEDICINE | Admitting: EMERGENCY MEDICINE
Payer: COMMERCIAL

## 2020-06-23 VITALS
SYSTOLIC BLOOD PRESSURE: 117 MMHG | HEIGHT: 63 IN | WEIGHT: 168.21 LBS | DIASTOLIC BLOOD PRESSURE: 83 MMHG | RESPIRATION RATE: 16 BRPM | OXYGEN SATURATION: 100 % | BODY MASS INDEX: 29.8 KG/M2 | TEMPERATURE: 98.8 F | HEART RATE: 78 BPM

## 2020-06-23 DIAGNOSIS — G43.009 MIGRAINE WITHOUT AURA AND WITHOUT STATUS MIGRAINOSUS, NOT INTRACTABLE: Primary | ICD-10-CM

## 2020-06-23 DIAGNOSIS — R10.10 INTERMITTENT UPPER ABDOMINAL PAIN: ICD-10-CM

## 2020-06-23 LAB
ALBUMIN SERPL-MCNC: 3.7 G/DL (ref 3.5–5)
ALBUMIN/GLOB SERPL: 0.8 {RATIO} (ref 1.1–2.2)
ALP SERPL-CCNC: 95 U/L (ref 45–117)
ALT SERPL-CCNC: 33 U/L (ref 12–78)
ANION GAP SERPL CALC-SCNC: 12 MMOL/L (ref 5–15)
APPEARANCE UR: CLEAR
AST SERPL-CCNC: 26 U/L (ref 15–37)
BASOPHILS # BLD: 0 K/UL (ref 0–0.1)
BASOPHILS NFR BLD: 1 % (ref 0–1)
BILIRUB SERPL-MCNC: 0.4 MG/DL (ref 0.2–1)
BILIRUB UR QL: NEGATIVE
BUN SERPL-MCNC: 7 MG/DL (ref 6–20)
BUN/CREAT SERPL: 9 (ref 12–20)
CALCIUM SERPL-MCNC: 9.3 MG/DL (ref 8.5–10.1)
CHLORIDE SERPL-SCNC: 106 MMOL/L (ref 97–108)
CO2 SERPL-SCNC: 23 MMOL/L (ref 21–32)
COLOR UR: ABNORMAL
COMMENT, HOLDF: NORMAL
CREAT SERPL-MCNC: 0.81 MG/DL (ref 0.55–1.02)
DIFFERENTIAL METHOD BLD: ABNORMAL
EOSINOPHIL # BLD: 0 K/UL (ref 0–0.4)
EOSINOPHIL NFR BLD: 1 % (ref 0–7)
ERYTHROCYTE [DISTWIDTH] IN BLOOD BY AUTOMATED COUNT: 19.1 % (ref 11.5–14.5)
GLOBULIN SER CALC-MCNC: 4.6 G/DL (ref 2–4)
GLUCOSE SERPL-MCNC: 102 MG/DL (ref 65–100)
GLUCOSE UR STRIP.AUTO-MCNC: NEGATIVE MG/DL
HCT VFR BLD AUTO: 31.9 % (ref 35–47)
HGB BLD-MCNC: 10 G/DL (ref 11.5–16)
HGB UR QL STRIP: NEGATIVE
IMM GRANULOCYTES # BLD AUTO: 0 K/UL (ref 0–0.04)
IMM GRANULOCYTES NFR BLD AUTO: 0 % (ref 0–0.5)
KETONES UR QL STRIP.AUTO: ABNORMAL MG/DL
LEUKOCYTE ESTERASE UR QL STRIP.AUTO: NEGATIVE
LIPASE SERPL-CCNC: 135 U/L (ref 73–393)
LYMPHOCYTES # BLD: 1 K/UL (ref 0.8–3.5)
LYMPHOCYTES NFR BLD: 28 % (ref 12–49)
MCH RBC QN AUTO: 27.3 PG (ref 26–34)
MCHC RBC AUTO-ENTMCNC: 31.3 G/DL (ref 30–36.5)
MCV RBC AUTO: 87.2 FL (ref 80–99)
MONOCYTES # BLD: 0.3 K/UL (ref 0–1)
MONOCYTES NFR BLD: 8 % (ref 5–13)
NEUTS SEG # BLD: 2.2 K/UL (ref 1.8–8)
NEUTS SEG NFR BLD: 62 % (ref 32–75)
NITRITE UR QL STRIP.AUTO: NEGATIVE
NRBC # BLD: 0 K/UL (ref 0–0.01)
NRBC BLD-RTO: 0 PER 100 WBC
PH UR STRIP: 8 [PH] (ref 5–8)
PLATELET # BLD AUTO: 349 K/UL (ref 150–400)
PMV BLD AUTO: 9.4 FL (ref 8.9–12.9)
POTASSIUM SERPL-SCNC: 3.6 MMOL/L (ref 3.5–5.1)
PROT SERPL-MCNC: 8.3 G/DL (ref 6.4–8.2)
PROT UR STRIP-MCNC: NEGATIVE MG/DL
RBC # BLD AUTO: 3.66 M/UL (ref 3.8–5.2)
SAMPLES BEING HELD,HOLD: NORMAL
SODIUM SERPL-SCNC: 141 MMOL/L (ref 136–145)
SP GR UR REFRACTOMETRY: 1.01 (ref 1–1.03)
TROPONIN I SERPL-MCNC: <0.05 NG/ML
UROBILINOGEN UR QL STRIP.AUTO: 0.2 EU/DL (ref 0.2–1)
WBC # BLD AUTO: 3.6 K/UL (ref 3.6–11)

## 2020-06-23 PROCEDURE — 81003 URINALYSIS AUTO W/O SCOPE: CPT

## 2020-06-23 PROCEDURE — 76705 ECHO EXAM OF ABDOMEN: CPT

## 2020-06-23 PROCEDURE — 74011250636 HC RX REV CODE- 250/636: Performed by: EMERGENCY MEDICINE

## 2020-06-23 PROCEDURE — 96375 TX/PRO/DX INJ NEW DRUG ADDON: CPT

## 2020-06-23 PROCEDURE — 36415 COLL VENOUS BLD VENIPUNCTURE: CPT

## 2020-06-23 PROCEDURE — 80053 COMPREHEN METABOLIC PANEL: CPT

## 2020-06-23 PROCEDURE — 84484 ASSAY OF TROPONIN QUANT: CPT

## 2020-06-23 PROCEDURE — 83690 ASSAY OF LIPASE: CPT

## 2020-06-23 PROCEDURE — 70450 CT HEAD/BRAIN W/O DYE: CPT

## 2020-06-23 PROCEDURE — 96374 THER/PROPH/DIAG INJ IV PUSH: CPT

## 2020-06-23 PROCEDURE — 74011000250 HC RX REV CODE- 250: Performed by: EMERGENCY MEDICINE

## 2020-06-23 PROCEDURE — 96361 HYDRATE IV INFUSION ADD-ON: CPT

## 2020-06-23 PROCEDURE — 85025 COMPLETE CBC W/AUTO DIFF WBC: CPT

## 2020-06-23 PROCEDURE — 99284 EMERGENCY DEPT VISIT MOD MDM: CPT

## 2020-06-23 RX ORDER — DIPHENHYDRAMINE HYDROCHLORIDE 50 MG/ML
25 INJECTION, SOLUTION INTRAMUSCULAR; INTRAVENOUS
Status: COMPLETED | OUTPATIENT
Start: 2020-06-23 | End: 2020-06-23

## 2020-06-23 RX ORDER — APIXABAN 5 MG/1
TABLET, FILM COATED ORAL
COMMUNITY
Start: 2020-06-03

## 2020-06-23 RX ADMIN — SODIUM CHLORIDE 1000 ML: 900 INJECTION, SOLUTION INTRAVENOUS at 15:00

## 2020-06-23 RX ADMIN — DIPHENHYDRAMINE HYDROCHLORIDE 25 MG: 50 INJECTION, SOLUTION INTRAMUSCULAR; INTRAVENOUS at 15:00

## 2020-06-23 RX ADMIN — SODIUM CHLORIDE 10 MG: 9 INJECTION INTRAMUSCULAR; INTRAVENOUS; SUBCUTANEOUS at 15:00

## 2020-06-23 NOTE — ED TRIAGE NOTES
Patient arrived to ED room via wheelchair accompanied by ED RN. Pt voided for urine specimen prior to triage. Pt chief complaint of headache, nausea and right flank pain since this am. Pt states \"I spoke with my PCP and they are concerned for my gallbladder. \" Pt reports taking 2 tylenol this am. Pt reports she takes Eliquis for previous PEs 1 year ago. Pt denies fevers prior to coming to ED.

## 2020-06-24 ENCOUNTER — PATIENT OUTREACH (OUTPATIENT)
Dept: INTERNAL MEDICINE CLINIC | Age: 50
End: 2020-06-24

## 2020-06-24 NOTE — ED PROVIDER NOTES
53-year-old female with history of anemia and prior migraines years ago, presents to the emergency department noting multiple complaints. She states that she has been having intermittent right upper quadrant and epigastric abdominal pains over the last couple of months. She states that she has previously tested positive for H. pylori and it was thought that her symptoms might be secondary to peptic ulcer disease. She was referred to gastroenterology and has taken 1 round treatment for peptic ulcer disease but has not had any endoscopy or imaging. Patient states that she only has very mild discomfort in her right upper quadrant at this time but states that sometimes, particularly after she eats she has much more severe abdominal pain. She states that she has been following with her primary care provider for evaluation of this pain and expressed some concern for her gallbladder. No history of prior imaging or known history of gallstones. Additionally she notes a predominantly right-sided headache with associated nausea and mild photophobia since this morning. She denies any vision changes, numbness, weakness, difficulty speaking, difficulty walking. She denies any vomiting, diarrhea, fever, chills. No recent head injuries. She states that she used to have migraines but then after she had a baby that his symptoms seem to improve and has not had one in many years. Does not follow with neurology. She takes Eliquis for prior PE. She rates her headache as 6/10 in severity. She took 2 Tylenol this morning to no avail of her symptoms. Past Medical History:   Diagnosis Date    Anemia        History reviewed. No pertinent surgical history.       Family History:   Problem Relation Age of Onset    No Known Problems Mother     No Known Problems Father     No Known Problems Brother        Social History     Socioeconomic History    Marital status: SINGLE     Spouse name: Not on file    Number of children: Not on file    Years of education: Not on file    Highest education level: Not on file   Occupational History    Not on file   Social Needs    Financial resource strain: Not on file    Food insecurity     Worry: Not on file     Inability: Not on file    Transportation needs     Medical: Not on file     Non-medical: Not on file   Tobacco Use    Smoking status: Never Smoker    Smokeless tobacco: Never Used   Substance and Sexual Activity    Alcohol use: No    Drug use: No    Sexual activity: Yes     Partners: Male     Birth control/protection: None   Lifestyle    Physical activity     Days per week: Not on file     Minutes per session: Not on file    Stress: Not on file   Relationships    Social connections     Talks on phone: Not on file     Gets together: Not on file     Attends Jewish service: Not on file     Active member of club or organization: Not on file     Attends meetings of clubs or organizations: Not on file     Relationship status: Not on file    Intimate partner violence     Fear of current or ex partner: Not on file     Emotionally abused: Not on file     Physically abused: Not on file     Forced sexual activity: Not on file   Other Topics Concern    Not on file   Social History Narrative    Not on file         ALLERGIES: Patient has no known allergies. Review of Systems   Constitutional: Positive for activity change and appetite change. Negative for chills and fever. HENT: Negative for congestion, rhinorrhea, sinus pressure, sneezing and sore throat. Eyes: Negative for photophobia and visual disturbance. Respiratory: Negative for cough and shortness of breath. Cardiovascular: Negative for chest pain. Gastrointestinal: Positive for abdominal pain and nausea. Negative for blood in stool, constipation, diarrhea and vomiting.    Genitourinary: Negative for difficulty urinating, dysuria, flank pain, frequency, hematuria, menstrual problem, urgency, vaginal bleeding and vaginal discharge. Musculoskeletal: Negative for arthralgias, back pain, myalgias and neck pain. Skin: Negative for rash and wound. Neurological: Positive for headaches. Negative for dizziness, syncope, facial asymmetry, speech difficulty, weakness, light-headedness and numbness. Psychiatric/Behavioral: Negative for self-injury and suicidal ideas. All other systems reviewed and are negative. Vitals:    06/23/20 1421 06/23/20 1430 06/23/20 1530 06/23/20 1714   BP: (!) 148/94 136/88 123/71 117/83   Pulse: 73  72 78   Resp: 20  16    Temp: 98 °F (36.7 °C)  98.2 °F (36.8 °C) 98.8 °F (37.1 °C)   SpO2: 100% 100% 99% 100%   Weight: 76.3 kg (168 lb 3.4 oz)      Height: 5' 3\" (1.6 m)               Physical Exam  Vitals signs and nursing note reviewed. Constitutional:       General: She is not in acute distress. Appearance: Normal appearance. She is well-developed. She is not diaphoretic. HENT:      Head: Normocephalic and atraumatic. Nose: Nose normal.   Eyes:      Extraocular Movements: Extraocular movements intact. Conjunctiva/sclera: Conjunctivae normal.      Pupils: Pupils are equal, round, and reactive to light. Neck:      Musculoskeletal: Neck supple. Cardiovascular:      Rate and Rhythm: Normal rate and regular rhythm. Heart sounds: Normal heart sounds. Pulmonary:      Effort: Pulmonary effort is normal.      Breath sounds: Normal breath sounds. Abdominal:      General: There is no distension. Palpations: Abdomen is soft. Tenderness: There is abdominal tenderness (Mild) in the right upper quadrant and epigastric area. There is no right CVA tenderness, left CVA tenderness, guarding or rebound. Negative signs include Burgos's sign and McBurney's sign. Musculoskeletal:         General: No tenderness. Skin:     General: Skin is warm and dry. Neurological:      General: No focal deficit present.       Mental Status: She is alert and oriented to person, place, and time. Cranial Nerves: No cranial nerve deficit. Sensory: No sensory deficit. Motor: No weakness. Coordination: Coordination normal.      Comments: Intact sensation, 5/5 strength in all 4 extremities, intact finger to nose, neg pronator drift, fluent speech, CN intact. MDM   58-year-old female presents with headache with migraine type characteristics, as well as intermittent right upper quadrant/epigastric abdominal pains. Patient is afebrile with vital signs stable in no acute distress. She was given a migraine cocktail in the ED and had significant improvement in her headache, with no further symptoms after medication administration. Labs returned reassuringly showing no significant abnormalities. UA negative for infection or hematuria. RUQ ultrasound shows no significant abnormalities. No gallstones dense of cholecystitis. CT head negative for any acute intracranial abnormalities. She is recommended to follow-up with her gastroenterologist for further evaluation of her symptoms intermittent abdominal pain. Feel that she may need endoscopy to evaluate for PUD. This plan was discussed with the patient at the bedside and she stated both understanding and agreement. Return precautions were given for worsening or concerns.    Procedures

## 2020-06-25 NOTE — PROGRESS NOTES
Outgoing calls placed regarding recent visit to [facility], no answer. Message left via voicemail with this writer's contact information. No response from patient episode of care closed unable to contact.

## 2020-09-01 ENCOUNTER — HOSPITAL ENCOUNTER (EMERGENCY)
Age: 50
Discharge: HOME | End: 2020-09-01
Attending: EMERGENCY MEDICINE
Payer: COMMERCIAL

## 2020-09-01 ENCOUNTER — APPOINTMENT (OUTPATIENT)
Dept: CT IMAGING | Age: 50
End: 2020-09-01
Attending: EMERGENCY MEDICINE
Payer: COMMERCIAL

## 2020-09-01 VITALS
TEMPERATURE: 98.5 F | RESPIRATION RATE: 13 BRPM | HEIGHT: 65 IN | HEART RATE: 70 BPM | BODY MASS INDEX: 28.36 KG/M2 | OXYGEN SATURATION: 99 % | WEIGHT: 170.19 LBS | SYSTOLIC BLOOD PRESSURE: 122 MMHG | DIASTOLIC BLOOD PRESSURE: 72 MMHG

## 2020-09-01 DIAGNOSIS — M54.6 ACUTE LEFT-SIDED THORACIC BACK PAIN: Primary | ICD-10-CM

## 2020-09-01 LAB
ALBUMIN SERPL-MCNC: 3.3 G/DL (ref 3.5–5)
ALBUMIN/GLOB SERPL: 0.8 {RATIO} (ref 1.1–2.2)
ALP SERPL-CCNC: 94 U/L (ref 45–117)
ALT SERPL-CCNC: 30 U/L (ref 12–78)
ANION GAP SERPL CALC-SCNC: 7 MMOL/L (ref 5–15)
AST SERPL-CCNC: 31 U/L (ref 15–37)
BASOPHILS # BLD: 0 K/UL (ref 0–0.1)
BASOPHILS NFR BLD: 1 % (ref 0–1)
BILIRUB SERPL-MCNC: 0.3 MG/DL (ref 0.2–1)
BUN SERPL-MCNC: 8 MG/DL (ref 6–20)
BUN/CREAT SERPL: 8 (ref 12–20)
CALCIUM SERPL-MCNC: 8.9 MG/DL (ref 8.5–10.1)
CHLORIDE SERPL-SCNC: 104 MMOL/L (ref 97–108)
CO2 SERPL-SCNC: 26 MMOL/L (ref 21–32)
COMMENT, HOLDF: NORMAL
CREAT SERPL-MCNC: 1 MG/DL (ref 0.55–1.02)
DIFFERENTIAL METHOD BLD: ABNORMAL
EOSINOPHIL # BLD: 0.1 K/UL (ref 0–0.4)
EOSINOPHIL NFR BLD: 2 % (ref 0–7)
ERYTHROCYTE [DISTWIDTH] IN BLOOD BY AUTOMATED COUNT: 20.4 % (ref 11.5–14.5)
GLOBULIN SER CALC-MCNC: 4.4 G/DL (ref 2–4)
GLUCOSE SERPL-MCNC: 89 MG/DL (ref 65–100)
HCT VFR BLD AUTO: 23.5 % (ref 35–47)
HGB BLD-MCNC: 7.1 G/DL (ref 11.5–16)
IMM GRANULOCYTES # BLD AUTO: 0 K/UL (ref 0–0.04)
IMM GRANULOCYTES NFR BLD AUTO: 1 % (ref 0–0.5)
LYMPHOCYTES # BLD: 1.3 K/UL (ref 0.8–3.5)
LYMPHOCYTES NFR BLD: 32 % (ref 12–49)
MCH RBC QN AUTO: 24.1 PG (ref 26–34)
MCHC RBC AUTO-ENTMCNC: 30.2 G/DL (ref 30–36.5)
MCV RBC AUTO: 79.7 FL (ref 80–99)
MONOCYTES # BLD: 0.2 K/UL (ref 0–1)
MONOCYTES NFR BLD: 5 % (ref 5–13)
NEUTS SEG # BLD: 2.5 K/UL (ref 1.8–8)
NEUTS SEG NFR BLD: 59 % (ref 32–75)
NRBC # BLD: 0 K/UL (ref 0–0.01)
NRBC BLD-RTO: 0 PER 100 WBC
PLATELET # BLD AUTO: 288 K/UL (ref 150–400)
PMV BLD AUTO: 10 FL (ref 8.9–12.9)
POTASSIUM SERPL-SCNC: 3.8 MMOL/L (ref 3.5–5.1)
PROT SERPL-MCNC: 7.7 G/DL (ref 6.4–8.2)
RBC # BLD AUTO: 2.95 M/UL (ref 3.8–5.2)
RBC MORPH BLD: ABNORMAL
SAMPLES BEING HELD,HOLD: NORMAL
SODIUM SERPL-SCNC: 137 MMOL/L (ref 136–145)
TROPONIN I SERPL-MCNC: <0.05 NG/ML
WBC # BLD AUTO: 4.1 K/UL (ref 3.6–11)

## 2020-09-01 PROCEDURE — 71275 CT ANGIOGRAPHY CHEST: CPT

## 2020-09-01 PROCEDURE — 74011000636 HC RX REV CODE- 636: Performed by: EMERGENCY MEDICINE

## 2020-09-01 PROCEDURE — 93005 ELECTROCARDIOGRAM TRACING: CPT

## 2020-09-01 PROCEDURE — 74011000258 HC RX REV CODE- 258: Performed by: EMERGENCY MEDICINE

## 2020-09-01 PROCEDURE — 85025 COMPLETE CBC W/AUTO DIFF WBC: CPT

## 2020-09-01 PROCEDURE — 36415 COLL VENOUS BLD VENIPUNCTURE: CPT

## 2020-09-01 PROCEDURE — 99283 EMERGENCY DEPT VISIT LOW MDM: CPT

## 2020-09-01 PROCEDURE — 80053 COMPREHEN METABOLIC PANEL: CPT

## 2020-09-01 PROCEDURE — 84484 ASSAY OF TROPONIN QUANT: CPT

## 2020-09-01 RX ORDER — SODIUM CHLORIDE 9 MG/ML
10 INJECTION INTRAMUSCULAR; INTRAVENOUS; SUBCUTANEOUS ONCE
Status: COMPLETED | OUTPATIENT
Start: 2020-09-01 | End: 2020-09-01

## 2020-09-01 RX ORDER — LIDOCAINE 50 MG/G
PATCH TOPICAL
Qty: 1 EACH | Refills: 0 | Status: SHIPPED | OUTPATIENT
Start: 2020-09-01

## 2020-09-01 RX ORDER — SODIUM CHLORIDE 9 MG/ML
50 INJECTION, SOLUTION INTRAVENOUS
Status: COMPLETED | OUTPATIENT
Start: 2020-09-01 | End: 2020-09-01

## 2020-09-01 RX ADMIN — SODIUM CHLORIDE 50 ML: 900 INJECTION, SOLUTION INTRAVENOUS at 16:04

## 2020-09-01 RX ADMIN — SODIUM CHLORIDE 10 ML: 9 INJECTION INTRAMUSCULAR; INTRAVENOUS; SUBCUTANEOUS at 16:05

## 2020-09-01 RX ADMIN — IOPAMIDOL 80 ML: 755 INJECTION, SOLUTION INTRAVENOUS at 16:04

## 2020-09-01 NOTE — ED TRIAGE NOTES
Pt c/o of left side back pain that radiates to chest. Started at 10am today. Worse with deep breath and position change. Hx of clots, takes eliquis. Referred to ER by PCP.

## 2020-09-01 NOTE — DISCHARGE INSTRUCTIONS

## 2020-09-01 NOTE — ED PROVIDER NOTES
Ms. Prerna Plascencia is a 48-year-old female who presents to the ER with left-sided back pain. Her pain started earlier today. She has a history of PEs. She is on Eliquis for the PEs. She spoke to her primary care doctor who recommended that she come in to the ER today to be evaluated for PE. She said that she has pain in the left lateral chest.  Is worse when she takes a deep breath or if she moves around. She has some mild pain that radiates to her left chest.  No fevers or chills. No nausea or vomiting. She reports generalized fatigue. She denies any other complaints. She says that she had her blood work drawn at [facility] on Thursday last week. She states that her hemoglobin was low then. She said that she is currently on her menstrual cycle. She reports having history of anemia related to vaginal bleeding. Past Medical History:   Diagnosis Date    Anemia     H. pylori infection     Pulmonary embolism ([address]        History reviewed. No pertinent surgical history.       Family History:   Problem Relation Age of Onset    No Known Problems Mother     No Known Problems Father     No Known Problems Brother        Social History     Socioeconomic History    Marital status: SINGLE     Spouse name: Not on file    Number of children: Not on file    Years of education: Not on file    Highest education level: Not on file   Occupational History    Not on file   Social Needs    Financial resource strain: Not on file    Food insecurity     Worry: Not on file     Inability: Not on file    Transportation needs     Medical: Not on file     Non-medical: Not on file   Tobacco Use    Smoking status: Never Smoker    Smokeless tobacco: Never Used   Substance and Sexual Activity    Alcohol use: Yes     Comment: social    Drug use: No    Sexual activity: Yes     Partners: Male     Birth control/protection: None   Lifestyle    Physical activity     Days per week: Not on file     Minutes per session: Not on file    Stress: Not on file   Relationships    Social connections     Talks on phone: Not on file     Gets together: Not on file     Attends [employer] service: Not on file     Active member of club or organization: Not on file     Attends meetings of clubs or organizations: Not on file     Relationship status: Not on file    Intimate partner violence     Fear of current or ex partner: Not on file     Emotionally abused: Not on file     Physically abused: Not on file     Forced sexual activity: Not on file   Other Topics Concern    Not on file   Social History Narrative    Not on file         ALLERGIES: Patient has no known allergies. Review of Systems   Constitutional: Positive for fatigue. Negative for chills and fever. HENT: Negative for rhinorrhea and sore throat. Respiratory: Negative for cough and shortness of breath. Cardiovascular: Positive for chest pain. Gastrointestinal: Negative for abdominal pain, diarrhea, nausea and vomiting. Genitourinary: Negative for dysuria and urgency. Musculoskeletal: Positive for back pain. Negative for arthralgias. Skin: Negative for rash. Neurological: Negative for dizziness, weakness and light-headedness. Vitals:    09/01/20 1442   BP: 127/88   Pulse: 84   Resp: 16   Temp: 98.5 °F (36.9 °C)   SpO2: 100%   Weight: 77.2 kg (170 lb 3.1 oz)   Height: 5' 5\" (1.651 m)            Physical Exam     Vital signs reviewed. Nursing notes reviewed.     Const:  No acute distress, well developed, well nourished  Head:  Atraumatic, normocephalic  Eyes:  PERRL, conjunctiva normal, no scleral icterus  Neck:  Supple, trachea midline  Cardiovascular:  RRR, no murmurs, no gallops, no rubs  Resp:  No resp distress, no increased work of breathing, no wheezes, no rhonchi, no rales,  Abd:  Soft, non-tender, non-distended, no rebound, no guarding, no CVA tenderness  MSK:  No pedal edema, normal ROM, back pain with sitting up or moving around  Neuro:  Alert and oriented x3, no cranial nerve defect  Skin:  Warm, dry, intact  Psych: normal mood and affect, behavior is normal, judgement and thought content is normal          MDM  Number of Diagnoses or Management Options  Acute left-sided thoracic back pain:      Amount and/or Complexity of Data Reviewed  Clinical lab tests: ordered and reviewed  Tests in the radiology section of CPT®: ordered and reviewed  Review and summarize past medical records: yes    Patient Progress  Patient progress: stable         Ms. Ramesh Maradiaga is a 51yo female who presents to the ER with back pain. Pt. Is well appearing in the ER. Her pain seems MSK in origin. No PE on CT. No mass or bleed. Negative troponin. Pt. Found to be anemic. She has a history of anemia and is followed closely by heme. Pt. Agrees to call the tomorrow. She has vaginal bleeding, but she denies any other bleeding symptoms. Her vaginal bleeding is similar to her normal menses. Pt. To f/u with her PCP and hematologist or return to the ER with new or worsening sx.         Procedures

## 2020-09-01 NOTE — ED NOTES
Pt ambulatory out of ED with discharge instructions and prescriptions in hand given by Dr. Izzy Pena; pt verbalized understanding of discharge paperwork and time allotted for questions. VSS. Pt alert and oriented.

## 2020-09-03 LAB
ATRIAL RATE: 77 BPM
CALCULATED P AXIS, ECG09: 61 DEGREES
CALCULATED R AXIS, ECG10: 24 DEGREES
CALCULATED T AXIS, ECG11: 52 DEGREES
DIAGNOSIS, 93000: NORMAL
P-R INTERVAL, ECG05: 164 MS
Q-T INTERVAL, ECG07: 394 MS
QRS DURATION, ECG06: 80 MS
QTC CALCULATION (BEZET), ECG08: 445 MS
VENTRICULAR RATE, ECG03: 77 BPM

## 2022-03-20 PROBLEM — I26.99 PULMONARY EMBOLISM (HCC): Status: ACTIVE | Noted: 2019-06-18

## 2023-05-18 RX ORDER — LIDOCAINE 50 MG/G
PATCH TOPICAL
COMMUNITY
Start: 2020-09-01

## 2023-05-18 RX ORDER — FERROUS SULFATE 325(65) MG
325 TABLET ORAL 2 TIMES DAILY WITH MEALS
COMMUNITY
Start: 2017-11-16

## 2023-05-18 RX ORDER — ACETAMINOPHEN 325 MG/1
650 TABLET ORAL EVERY 4 HOURS PRN
COMMUNITY
Start: 2019-06-21

## 2023-06-24 ENCOUNTER — HOSPITAL ENCOUNTER (EMERGENCY)
Facility: HOSPITAL | Age: 53
Discharge: HOME | End: 2023-06-25
Attending: STUDENT IN AN ORGANIZED HEALTH CARE EDUCATION/TRAINING PROGRAM
Payer: COMMERCIAL

## 2023-06-24 DIAGNOSIS — M54.6 ACUTE MIDLINE THORACIC BACK PAIN: Primary | ICD-10-CM

## 2023-06-24 LAB
BASOPHILS # BLD: 0 K/UL (ref 0–0.1)
BASOPHILS NFR BLD: 1 % (ref 0–1)
DIFFERENTIAL METHOD BLD: NORMAL
EOSINOPHIL # BLD: 0.1 K/UL (ref 0–0.4)
EOSINOPHIL NFR BLD: 1 % (ref 0–7)
ERYTHROCYTE [DISTWIDTH] IN BLOOD BY AUTOMATED COUNT: 13.7 % (ref 11.5–14.5)
HCT VFR BLD AUTO: 37.2 % (ref 35–47)
HGB BLD-MCNC: 12.3 G/DL (ref 11.5–16)
IMM GRANULOCYTES # BLD AUTO: 0 K/UL (ref 0–0.04)
IMM GRANULOCYTES NFR BLD AUTO: 0 % (ref 0–0.5)
LYMPHOCYTES # BLD: 1.1 K/UL (ref 0.8–3.5)
LYMPHOCYTES NFR BLD: 20 % (ref 12–49)
MCH RBC QN AUTO: 29.4 PG (ref 26–34)
MCHC RBC AUTO-ENTMCNC: 33.1 G/DL (ref 30–36.5)
MCV RBC AUTO: 88.8 FL (ref 80–99)
MONOCYTES # BLD: 0.4 K/UL (ref 0–1)
MONOCYTES NFR BLD: 7 % (ref 5–13)
NEUTS SEG # BLD: 4 K/UL (ref 1.8–8)
NEUTS SEG NFR BLD: 71 % (ref 32–75)
NRBC # BLD: 0 K/UL (ref 0–0.01)
NRBC BLD-RTO: 0 PER 100 WBC
PLATELET # BLD AUTO: 151 K/UL (ref 150–400)
PMV BLD AUTO: 10.3 FL (ref 8.9–12.9)
RBC # BLD AUTO: 4.19 M/UL (ref 3.8–5.2)
WBC # BLD AUTO: 5.6 K/UL (ref 3.6–11)

## 2023-06-24 PROCEDURE — 85025 COMPLETE CBC W/AUTO DIFF WBC: CPT

## 2023-06-24 PROCEDURE — 83690 ASSAY OF LIPASE: CPT

## 2023-06-24 PROCEDURE — 99285 EMERGENCY DEPT VISIT HI MDM: CPT

## 2023-06-24 PROCEDURE — 80053 COMPREHEN METABOLIC PANEL: CPT

## 2023-06-24 PROCEDURE — 36415 COLL VENOUS BLD VENIPUNCTURE: CPT

## 2023-06-24 RX ORDER — TRAMADOL HYDROCHLORIDE 50 MG/1
100 TABLET ORAL
Status: DISCONTINUED | OUTPATIENT
Start: 2023-06-24 | End: 2023-06-25

## 2023-06-24 ASSESSMENT — PAIN DESCRIPTION - DESCRIPTORS: DESCRIPTORS: ITCHING

## 2023-06-24 ASSESSMENT — PAIN DESCRIPTION - LOCATION: LOCATION: FLANK;BACK

## 2023-06-24 ASSESSMENT — PAIN DESCRIPTION - ORIENTATION: ORIENTATION: RIGHT

## 2023-06-24 ASSESSMENT — PAIN SCALES - GENERAL: PAINLEVEL_OUTOF10: 9

## 2023-06-24 ASSESSMENT — PAIN DESCRIPTION - FREQUENCY: FREQUENCY: CONTINUOUS

## 2023-06-25 ENCOUNTER — APPOINTMENT (OUTPATIENT)
Facility: HOSPITAL | Age: 53
End: 2023-06-25
Payer: COMMERCIAL

## 2023-06-25 VITALS
BODY MASS INDEX: 26.61 KG/M2 | OXYGEN SATURATION: 99 % | SYSTOLIC BLOOD PRESSURE: 147 MMHG | WEIGHT: 165.57 LBS | HEIGHT: 66 IN | HEART RATE: 75 BPM | RESPIRATION RATE: 16 BRPM | TEMPERATURE: 99.1 F | DIASTOLIC BLOOD PRESSURE: 92 MMHG

## 2023-06-25 LAB
ALBUMIN SERPL-MCNC: 3.6 G/DL (ref 3.5–5)
ALBUMIN/GLOB SERPL: 0.9 (ref 1.1–2.2)
ALP SERPL-CCNC: 118 U/L (ref 45–117)
ALT SERPL-CCNC: 20 U/L (ref 12–78)
ANION GAP SERPL CALC-SCNC: 7 MMOL/L (ref 5–15)
APPEARANCE UR: CLEAR
AST SERPL-CCNC: 18 U/L (ref 15–37)
BACTERIA URNS QL MICRO: NEGATIVE /HPF
BILIRUB SERPL-MCNC: 0.7 MG/DL (ref 0.2–1)
BILIRUB UR QL: NEGATIVE
BUN SERPL-MCNC: 6 MG/DL (ref 6–20)
BUN/CREAT SERPL: 7 (ref 12–20)
CALCIUM SERPL-MCNC: 8.8 MG/DL (ref 8.5–10.1)
CHLORIDE SERPL-SCNC: 105 MMOL/L (ref 97–108)
CO2 SERPL-SCNC: 28 MMOL/L (ref 21–32)
COLOR UR: NORMAL
CREAT SERPL-MCNC: 0.84 MG/DL (ref 0.55–1.02)
EPITH CASTS URNS QL MICRO: NORMAL /LPF
GLOBULIN SER CALC-MCNC: 4.2 G/DL (ref 2–4)
GLUCOSE SERPL-MCNC: 106 MG/DL (ref 65–100)
GLUCOSE UR STRIP.AUTO-MCNC: NEGATIVE MG/DL
HGB UR QL STRIP: NEGATIVE
KETONES UR QL STRIP.AUTO: NEGATIVE MG/DL
LEUKOCYTE ESTERASE UR QL STRIP.AUTO: NEGATIVE
LIPASE SERPL-CCNC: 90 U/L (ref 73–393)
NITRITE UR QL STRIP.AUTO: NEGATIVE
PH UR STRIP: 7 (ref 5–8)
POTASSIUM SERPL-SCNC: 3.5 MMOL/L (ref 3.5–5.1)
PROT SERPL-MCNC: 7.8 G/DL (ref 6.4–8.2)
PROT UR STRIP-MCNC: NEGATIVE MG/DL
RBC #/AREA URNS HPF: NORMAL /HPF (ref 0–5)
SODIUM SERPL-SCNC: 140 MMOL/L (ref 136–145)
SP GR UR REFRACTOMETRY: 1.01 (ref 1–1.03)
UROBILINOGEN UR QL STRIP.AUTO: 0.2 EU/DL (ref 0.2–1)
WBC URNS QL MICRO: NORMAL /HPF (ref 0–4)

## 2023-06-25 PROCEDURE — 81001 URINALYSIS AUTO W/SCOPE: CPT

## 2023-06-25 PROCEDURE — 71275 CT ANGIOGRAPHY CHEST: CPT

## 2023-06-25 PROCEDURE — 6370000000 HC RX 637 (ALT 250 FOR IP): Performed by: STUDENT IN AN ORGANIZED HEALTH CARE EDUCATION/TRAINING PROGRAM

## 2023-06-25 PROCEDURE — 6360000004 HC RX CONTRAST MEDICATION: Performed by: STUDENT IN AN ORGANIZED HEALTH CARE EDUCATION/TRAINING PROGRAM

## 2023-06-25 RX ORDER — TRAMADOL HYDROCHLORIDE 50 MG/1
50 TABLET ORAL EVERY 4 HOURS PRN
Qty: 18 TABLET | Refills: 0 | Status: SHIPPED | OUTPATIENT
Start: 2023-06-25 | End: 2023-06-28

## 2023-06-25 RX ORDER — ACETAMINOPHEN 325 MG/1
650 TABLET ORAL
Status: COMPLETED | OUTPATIENT
Start: 2023-06-25 | End: 2023-06-25

## 2023-06-25 RX ADMIN — IOPAMIDOL 100 ML: 755 INJECTION, SOLUTION INTRAVENOUS at 00:35

## 2023-06-25 RX ADMIN — ACETAMINOPHEN 650 MG: 325 TABLET ORAL at 00:46
